# Patient Record
Sex: MALE | Race: ASIAN | NOT HISPANIC OR LATINO | Employment: FULL TIME | URBAN - METROPOLITAN AREA
[De-identification: names, ages, dates, MRNs, and addresses within clinical notes are randomized per-mention and may not be internally consistent; named-entity substitution may affect disease eponyms.]

---

## 2017-11-08 ENCOUNTER — GENERIC CONVERSION - ENCOUNTER (OUTPATIENT)
Dept: OTHER | Facility: OTHER | Age: 47
End: 2017-11-08

## 2018-01-09 NOTE — MISCELLANEOUS
Provider Comments  Provider Comments:   Dear Santi Herrera,    You missed your appointment with Dr Hernández Born on 11/08/2017; please contact our office to reschedule at [phone]  We understand that many situations arise that occasionally prevents patients from keeping scheduled appointments  It is the policy of [facility] that patients notify us 24 hours in advance if unable to keep a scheduled appointment  Missed appointments jeopardize strong physician-patient relationships  The appointment you missed could have easily been made available to another patient if you had contacted us to cancel  We like to accommodate all of our patients, but when patients miss an appointment it prevents us from being able to help everyone  In the future, we request at least 24 hours' notice of cancellation so we can make your appointment available to someone else in need       Sincerely,    The Physicians and Staff of [facility]        Signatures   Electronically signed by : Myron Lamb, ; Nov 8 2017  1:45PM EST                       (Author)

## 2018-09-10 PROBLEM — I10 BENIGN ESSENTIAL HYPERTENSION: Status: ACTIVE | Noted: 2017-09-25

## 2018-09-10 PROBLEM — E11.9 TYPE 2 DIABETES MELLITUS WITHOUT COMPLICATION (HCC): Status: ACTIVE | Noted: 2017-09-25

## 2018-09-12 ENCOUNTER — OFFICE VISIT (OUTPATIENT)
Dept: FAMILY MEDICINE CLINIC | Facility: CLINIC | Age: 48
End: 2018-09-12
Payer: COMMERCIAL

## 2018-09-12 VITALS
RESPIRATION RATE: 16 BRPM | OXYGEN SATURATION: 95 % | HEIGHT: 74 IN | BODY MASS INDEX: 29 KG/M2 | TEMPERATURE: 98.1 F | DIASTOLIC BLOOD PRESSURE: 78 MMHG | SYSTOLIC BLOOD PRESSURE: 114 MMHG | HEART RATE: 98 BPM | WEIGHT: 226 LBS

## 2018-09-12 DIAGNOSIS — E11.8 TYPE 2 DIABETES MELLITUS WITH COMPLICATION, UNSPECIFIED WHETHER LONG TERM INSULIN USE: Primary | ICD-10-CM

## 2018-09-12 DIAGNOSIS — I10 BENIGN ESSENTIAL HYPERTENSION: ICD-10-CM

## 2018-09-12 DIAGNOSIS — Z23 NEED FOR PNEUMOCOCCAL VACCINATION: ICD-10-CM

## 2018-09-12 DIAGNOSIS — Z23 NEED FOR INFLUENZA VACCINATION: ICD-10-CM

## 2018-09-12 DIAGNOSIS — E11.9 TYPE 2 DIABETES MELLITUS WITHOUT COMPLICATION, WITHOUT LONG-TERM CURRENT USE OF INSULIN (HCC): ICD-10-CM

## 2018-09-12 DIAGNOSIS — K21.9 GASTROESOPHAGEAL REFLUX DISEASE WITHOUT ESOPHAGITIS: Primary | ICD-10-CM

## 2018-09-12 PROBLEM — R10.13 DYSPEPSIA: Status: ACTIVE | Noted: 2018-09-12

## 2018-09-12 PROBLEM — K63.5 COLON POLYP: Status: ACTIVE | Noted: 2018-09-12

## 2018-09-12 PROBLEM — K44.9 HIATAL HERNIA: Status: ACTIVE | Noted: 2018-09-12

## 2018-09-12 PROBLEM — K58.1 IRRITABLE BOWEL SYNDROME WITH CONSTIPATION: Status: ACTIVE | Noted: 2018-09-12

## 2018-09-12 PROBLEM — F41.9 ANXIETY: Status: ACTIVE | Noted: 2018-09-12

## 2018-09-12 PROCEDURE — 3008F BODY MASS INDEX DOCD: CPT | Performed by: FAMILY MEDICINE

## 2018-09-12 PROCEDURE — 99204 OFFICE O/P NEW MOD 45 MIN: CPT | Performed by: FAMILY MEDICINE

## 2018-09-12 RX ORDER — PRAVASTATIN SODIUM 40 MG
1 TABLET ORAL DAILY
COMMUNITY
Start: 2018-07-11 | End: 2018-10-10 | Stop reason: ALTCHOICE

## 2018-09-12 RX ORDER — CLOBETASOL PROPIONATE 0.5 MG/G
CREAM TOPICAL EVERY 12 HOURS
COMMUNITY
Start: 2017-09-25 | End: 2018-09-21 | Stop reason: SDUPTHER

## 2018-09-12 RX ORDER — LISINOPRIL 5 MG/1
1 TABLET ORAL EVERY 24 HOURS
COMMUNITY
Start: 2017-09-25 | End: 2019-10-16 | Stop reason: SDUPTHER

## 2018-09-12 RX ORDER — CLOBETASOL PROPIONATE 0.05 G/100ML
SHAMPOO TOPICAL
COMMUNITY
End: 2018-09-21 | Stop reason: SDUPTHER

## 2018-09-12 NOTE — TELEPHONE ENCOUNTER
patient says Linagliptin-Metformin HCI (Jentadueto) 2 5-1000 is not covered neither is Janumet please advise

## 2018-09-12 NOTE — PATIENT INSTRUCTIONS
10% - bad control"> 10% - bad control,Hemoglobin A1c (HbA1c) greater than 10% indicating poor diabetic control,Haemoglobin A1c greater than 10% indicating poor diabetic control">   Diabetes Mellitus Type 2 in Adults, Ambulatory Care   GENERAL INFORMATION:   Diabetes mellitus type 2  is a disease that affects how your body uses glucose (sugar)  Insulin helps move sugar out of the blood so it can be used for energy  Normally, when the blood sugar level increases, the pancreas makes more insulin  Type 2 diabetes develops because either the body cannot make enough insulin, or it cannot use the insulin correctly  After many years, your pancreas may stop making insulin  Common symptoms include the following:   · More hunger or thirst than usual     · Frequent urination     · Weight loss without trying     · Blurred vision  Seek immediate care for the following symptoms:   · Severe abdominal pain, or pain that spreads to your back  You may also be vomiting  · Trouble staying awake or focusing    · Shaking or sweating    · Blurred or double vision    · Breath has a fruity, sweet smell    · Breathing is deep and labored, or rapid and shallow    · Heartbeat is fast and weak  Treatment for diabetes mellitus type 2  includes keeping your blood sugar at a normal level  You must eat the right foods, and exercise regularly  You may also need medicine if you cannot control your blood sugar level with nutrition and exercise  Manage diabetes mellitus type 2:   · Check your blood sugar level  You will be taught how to check a small drop of blood in a glucose monitor  Ask your healthcare provider when and how often to check during the day  Ask your healthcare provider what your blood sugar levels should be when you check them  · Keep track of carbohydrates (sugar and starchy foods)  Your blood sugar level can get too high if you eat too many carbohydrates   Your dietitian will help you plan meals and snacks that have the right amount of carbohydrates  · Eat low-fat foods  Some examples are skinless chicken and low-fat milk  · Eat less sodium (salt)  Some examples of high-sodium foods to limit are soy sauce, potato chips, and soup  Do not add salt to food you cook  Limit your use of table salt  · Eat high-fiber foods  Foods that are a good source of fiber include vegetables, whole grain bread, and beans  · Limit alcohol  Alcohol affects your blood sugar level and can make it harder to manage your diabetes  Women should limit alcohol to 1 drink a day  Men should limit alcohol to 2 drinks a day  A drink of alcohol is 12 ounces of beer, 5 ounces of wine, or 1½ ounces of liquor  · Get regular exercise  Exercise can help keep your blood sugar level steady, decrease your risk of heart disease, and help you lose weight  Exercise for at least 30 minutes, 5 days a week  Include muscle strengthening activities 2 days each week  Work with your healthcare provider to create an exercise plan  · Check your feet each day  for injuries or open sores  Ask your healthcare provider for activities you can do if you have an open sore  · Quit smoking  If you smoke, it is never too late to quit  Smoking can worsen the problems that may occur with diabetes  Ask your healthcare provider for information about how to stop smoking if you are having trouble quitting  · Ask about your weight:  Ask healthcare providers if you need to lose weight, and how much to lose  Ask them to help you with a weight loss program  Even a 10 to 15 pound weight loss can help you manage your blood sugar level  · Carry medical alert identification  Wear medical alert jewelry or carry a card that says you have diabetes  Ask your healthcare provider where to get these items  · Ask about vaccines  Diabetes puts you at risk of serious illness if you get the flu, pneumonia, or hepatitis   Ask your healthcare provider if you should get a flu, pneumonia, or hepatitis B vaccine, and when to get the vaccine  Follow up with your healthcare provider as directed:  Write down your questions so you remember to ask them during your visits  CARE AGREEMENT:   You have the right to help plan your care  Learn about your health condition and how it may be treated  Discuss treatment options with your caregivers to decide what care you want to receive  You always have the right to refuse treatment  The above information is an  only  It is not intended as medical advice for individual conditions or treatments  Talk to your doctor, nurse or pharmacist before following any medical regimen to see if it is safe and effective for you  © 2014 [address] is for End User's use only and may not be sold, redistributed or otherwise used for commercial purposes  All illustrations and images included in CareNotes® are the copyrighted property of A D A M , Inc  or Newton Sinclair

## 2018-09-13 NOTE — ASSESSMENT & PLAN NOTE
Chronic patient did have a blood work for more 6 months  Juan Alberto Gallegos not covered by [employer] will start the patient on metformin 1000 twice a day and then Elenita S 25 mg once a day   patient already on Ace inhibitor and he is on statin per patient he does followed by Ophthalmology for diabetic eye care

## 2018-09-13 NOTE — PROGRESS NOTES
Assessment/Plan:    Benign essential hypertension  Chronic with controlled continue current medication low-salt diet less than 2 g a day ,   low caffeine intake   regular aerobic exercise 20 to totally minute a day diet and important lose weight discussed with the patient      Gastroesophageal reflux disease without esophagitis  Chronic ,well controlled by Nexium  Discuss with pt important lose weight   Multiple small meal ,avoid eat and lying down ,avoid spicy food and avoid provoked food        Type 2 diabetes mellitus without complication ([facility] [address] )  Chronic patient did have a blood work for more 6 months  Derek Candmaría not covered by [employer] will start the patient on metformin 1000 twice a day and then Elenita S 25 mg once a day   patient already on Ace inhibitor and he is on statin per patient he does followed by Ophthalmology for diabetic eye care         Diagnoses and all orders for this visit:    Gastroesophageal reflux disease without esophagitis  -     CBC and differential; Future  -     Comprehensive metabolic panel; Future  -     Hemoglobin A1C; Future  -     Lipid Panel with Direct LDL reflex; Future  -     Microalbumin / creatinine urine ratio; Future  -     TSH, 3rd generation with Free T4 reflex; Future  -     PSA, Total Screen; Future    Type 2 diabetes mellitus without complication, without long-term current use of insulin ([facility])  -     CBC and differential; Future  -     Comprehensive metabolic panel; Future  -     Hemoglobin A1C; Future  -     Lipid Panel with Direct LDL reflex; Future  -     Microalbumin / creatinine urine ratio; Future  -     TSH, 3rd generation with Free T4 reflex; Future  -     PSA, Total Screen; Future    Benign essential hypertension  -     CBC and differential; Future  -     Comprehensive metabolic panel; Future  -     Hemoglobin A1C; Future  -     Lipid Panel with Direct LDL reflex; Future  -     Microalbumin / creatinine urine ratio;  Future  -     TSH, 3rd generation with Free T4 reflex; Future  -     PSA, Total Screen; Future    Need for influenza vaccination  Comments:  Patient declined flu shot  Orders:  -     influenza vaccine, 8565-1174, quadrivalent, recombinant, PF, 0 5 mL, for patients 18-49 yr with comorbidities (FLUBLOK)    Need for pneumococcal vaccination  Comments:  Patient declined hand pneumonia shot  Orders:  -     Pneumococcal Polysaccharide Vaccine 23-Valent =>3yo SQ IM    Other orders  -     pravastatin (PRAVACHOL) 40 mg tablet; Take 1 tablet by mouth daily  -     esomeprazole (NEXIUM) 40 MG capsule; Take 1 capsule by mouth Every 12 hours  -     lisinopril (ZESTRIL) 5 mg tablet; Take 1 tablet by mouth every 24 hours  -     Discontinue: Linagliptin-Metformin HCl (JENTADUETO) 2 5-1000 MG TABS; Take 1 tablet by mouth Every 12 hours  -     clobetasol propionate (CLOBEX) 0 05 % shampoo; Apply topically  -     clobetasol (TEMOVATE) 0 05 % cream; Every 12 hours             Patient ID: Justin Vines is a 51 y o  male  Patient in my office new patient he had history of hypertension and insulin-dependent diabetes history of for GERD and his previous P CP smoking of 2 practice and [facility] care patient deny any chest pain short of breath no palpitation no headache no blurred vision no weakness her cause of the symptom no abdomen pain nausea vomiting or diarrhea and no urinary problem  And patient did not have a blood work for a long time his current medication for diabetes not covered by his insurance and requesting changing medication        The following portions of the patient's history were reviewed and updated as appropriate: allergies, current medications, past family history, past medical history, past social history, past surgical history and problem list     Review of Systems   Constitutional: Negative for fatigue and fever  HENT: Negative for ear pain, sinus pain, sinus pressure and sore throat  Eyes: Negative for pain and redness  Respiratory: Negative for cough, chest tightness and shortness of breath  Cardiovascular: Negative for chest pain, palpitations and leg swelling  Gastrointestinal: Negative for abdominal pain, blood in stool, constipation, diarrhea and nausea  Genitourinary: Negative for flank pain, frequency and hematuria  Musculoskeletal: Negative for back pain and joint swelling  Skin: Negative for rash  Neurological: Negative for dizziness, numbness and headaches  Hematological: Does not bruise/bleed easily  Objective:  Vitals:    09/12/18 0916   BP: 114/78   BP Location: Left arm   Patient Position: Sitting   Cuff Size: Large   Pulse: 98   Resp: 16   Temp: 98 1 °F (36 7 °C)   TempSrc: Oral   SpO2: 95%   Weight: 103 kg (226 lb)   Height: 6' 2" (1 88 m)      Physical Exam   Constitutional: He is oriented to person, place, and time  He appears well-developed and well-nourished  HENT:   Head: Normocephalic  Right Ear: External ear normal    Left Ear: External ear normal    Eyes: Conjunctivae and EOM are normal  Right eye exhibits no discharge  Left eye exhibits no discharge  Neck: No JVD present  Cardiovascular: Normal rate, regular rhythm and normal heart sounds  Exam reveals no gallop  Pulses are no weak pulses  No murmur heard  Pulses:       Dorsalis pedis pulses are 2+ on the right side, and 2+ on the left side  Pulmonary/Chest: Effort normal  No respiratory distress  He has no wheezes  He has no rales  He exhibits no tenderness  Abdominal: He exhibits no mass  There is no tenderness  There is no rebound  Musculoskeletal: He exhibits no edema or tenderness  Feet:   Right Foot:   Skin Integrity: Negative for warmth  Left Foot:   Skin Integrity: Negative for warmth  Neurological: He is alert and oriented to person, place, and time  Skin: No rash noted  No erythema  Patient's shoes and socks removed  Right Foot/Ankle   Right Foot Inspection  Skin Exam: skin not intact, no warmth and no pre-ulcer                          Toe Exam: no swelling and erythema  Sensory       Monofilament testing: intact  Vascular  Capillary refills: < 3 seconds  The right DP pulse is 2+  Left Foot/Ankle  Left Foot Inspection  Skin Exam: skin not intact, no warmth and no pre-ulcer                         Toe Exam: no swelling and no erythema                   Sensory       Monofilament: intact  Vascular  Capillary refills: < 3 seconds  The left DP pulse is 2+  Assign Risk Category:  No deformity present; No loss of protective sensation;  No weak pulses       Risk: 0

## 2018-09-21 DIAGNOSIS — L21.9 SEBORRHEIC DERMATITIS: Primary | ICD-10-CM

## 2018-09-21 RX ORDER — CLOBETASOL PROPIONATE 0.05 G/100ML
SHAMPOO TOPICAL 2 TIMES DAILY
Qty: 118 ML | Refills: 2 | Status: SHIPPED | OUTPATIENT
Start: 2018-09-21 | End: 2019-10-16 | Stop reason: SDUPTHER

## 2018-09-21 RX ORDER — CLOBETASOL PROPIONATE 0.5 MG/G
CREAM TOPICAL 2 TIMES DAILY
Qty: 30 G | Refills: 2 | Status: SHIPPED | OUTPATIENT
Start: 2018-09-21 | End: 2019-10-16 | Stop reason: SDUPTHER

## 2018-10-03 ENCOUNTER — LAB (OUTPATIENT)
Dept: LAB | Facility: MEDICAL CENTER | Age: 48
End: 2018-10-03
Payer: COMMERCIAL

## 2018-10-03 DIAGNOSIS — I10 BENIGN ESSENTIAL HYPERTENSION: ICD-10-CM

## 2018-10-03 DIAGNOSIS — K21.9 GASTROESOPHAGEAL REFLUX DISEASE WITHOUT ESOPHAGITIS: ICD-10-CM

## 2018-10-03 DIAGNOSIS — E11.9 TYPE 2 DIABETES MELLITUS WITHOUT COMPLICATION, WITHOUT LONG-TERM CURRENT USE OF INSULIN (HCC): ICD-10-CM

## 2018-10-03 LAB
ALBUMIN SERPL BCP-MCNC: 4 G/DL (ref 3.5–5)
ALP SERPL-CCNC: 66 U/L (ref 46–116)
ALT SERPL W P-5'-P-CCNC: 77 U/L (ref 12–78)
ANION GAP SERPL CALCULATED.3IONS-SCNC: 8 MMOL/L (ref 4–13)
AST SERPL W P-5'-P-CCNC: 52 U/L (ref 5–45)
BASOPHILS # BLD AUTO: 0.08 THOUSANDS/ΜL (ref 0–0.1)
BASOPHILS NFR BLD AUTO: 1 % (ref 0–1)
BILIRUB SERPL-MCNC: 0.65 MG/DL (ref 0.2–1)
BUN SERPL-MCNC: 10 MG/DL (ref 5–25)
CALCIUM SERPL-MCNC: 9 MG/DL (ref 8.3–10.1)
CHLORIDE SERPL-SCNC: 98 MMOL/L (ref 100–108)
CHOLEST SERPL-MCNC: 149 MG/DL (ref 50–200)
CO2 SERPL-SCNC: 26 MMOL/L (ref 21–32)
CREAT SERPL-MCNC: 0.98 MG/DL (ref 0.6–1.3)
CREAT UR-MCNC: 214 MG/DL
EOSINOPHIL # BLD AUTO: 0.84 THOUSAND/ΜL (ref 0–0.61)
EOSINOPHIL NFR BLD AUTO: 10 % (ref 0–6)
ERYTHROCYTE [DISTWIDTH] IN BLOOD BY AUTOMATED COUNT: 12.9 % (ref 11.6–15.1)
EST. AVERAGE GLUCOSE BLD GHB EST-MCNC: 212 MG/DL
GFR SERPL CREATININE-BSD FRML MDRD: 90 ML/MIN/1.73SQ M
GLUCOSE P FAST SERPL-MCNC: 211 MG/DL (ref 65–99)
HBA1C MFR BLD: 9 % (ref 4.2–6.3)
HCT VFR BLD AUTO: 44.6 % (ref 36.5–49.3)
HDLC SERPL-MCNC: 32 MG/DL (ref 40–60)
HGB BLD-MCNC: 14.5 G/DL (ref 12–17)
IMM GRANULOCYTES # BLD AUTO: 0.03 THOUSAND/UL (ref 0–0.2)
IMM GRANULOCYTES NFR BLD AUTO: 0 % (ref 0–2)
LDLC SERPL CALC-MCNC: 41 MG/DL (ref 0–100)
LYMPHOCYTES # BLD AUTO: 3.22 THOUSANDS/ΜL (ref 0.6–4.47)
LYMPHOCYTES NFR BLD AUTO: 40 % (ref 14–44)
MCH RBC QN AUTO: 26.8 PG (ref 26.8–34.3)
MCHC RBC AUTO-ENTMCNC: 32.5 G/DL (ref 31.4–37.4)
MCV RBC AUTO: 82 FL (ref 82–98)
MICROALBUMIN UR-MCNC: 38 MG/L (ref 0–20)
MICROALBUMIN/CREAT 24H UR: 18 MG/G CREATININE (ref 0–30)
MONOCYTES # BLD AUTO: 0.55 THOUSAND/ΜL (ref 0.17–1.22)
MONOCYTES NFR BLD AUTO: 7 % (ref 4–12)
NEUTROPHILS # BLD AUTO: 3.33 THOUSANDS/ΜL (ref 1.85–7.62)
NEUTS SEG NFR BLD AUTO: 42 % (ref 43–75)
NRBC BLD AUTO-RTO: 0 /100 WBCS
PLATELET # BLD AUTO: 251 THOUSANDS/UL (ref 149–390)
PMV BLD AUTO: 11 FL (ref 8.9–12.7)
POTASSIUM SERPL-SCNC: 4 MMOL/L (ref 3.5–5.3)
PROT SERPL-MCNC: 7.9 G/DL (ref 6.4–8.2)
PSA SERPL-MCNC: 0.2 NG/ML (ref 0–4)
RBC # BLD AUTO: 5.42 MILLION/UL (ref 3.88–5.62)
SODIUM SERPL-SCNC: 132 MMOL/L (ref 136–145)
TRIGL SERPL-MCNC: 382 MG/DL
TSH SERPL DL<=0.05 MIU/L-ACNC: 2.3 UIU/ML (ref 0.36–3.74)
WBC # BLD AUTO: 8.05 THOUSAND/UL (ref 4.31–10.16)

## 2018-10-03 PROCEDURE — 3061F NEG MICROALBUMINURIA REV: CPT | Performed by: FAMILY MEDICINE

## 2018-10-03 PROCEDURE — 80061 LIPID PANEL: CPT

## 2018-10-03 PROCEDURE — 80053 COMPREHEN METABOLIC PANEL: CPT

## 2018-10-03 PROCEDURE — 83036 HEMOGLOBIN GLYCOSYLATED A1C: CPT

## 2018-10-03 PROCEDURE — 84443 ASSAY THYROID STIM HORMONE: CPT

## 2018-10-03 PROCEDURE — 82043 UR ALBUMIN QUANTITATIVE: CPT

## 2018-10-03 PROCEDURE — 85025 COMPLETE CBC W/AUTO DIFF WBC: CPT

## 2018-10-03 PROCEDURE — 82570 ASSAY OF URINE CREATININE: CPT

## 2018-10-03 PROCEDURE — G0103 PSA SCREENING: HCPCS

## 2018-10-03 PROCEDURE — 36415 COLL VENOUS BLD VENIPUNCTURE: CPT

## 2018-10-04 DIAGNOSIS — E87.1 HYPONATREMIA: Primary | ICD-10-CM

## 2018-10-10 ENCOUNTER — OFFICE VISIT (OUTPATIENT)
Dept: FAMILY MEDICINE CLINIC | Facility: CLINIC | Age: 48
End: 2018-10-10
Payer: COMMERCIAL

## 2018-10-10 ENCOUNTER — APPOINTMENT (OUTPATIENT)
Dept: LAB | Facility: MEDICAL CENTER | Age: 48
End: 2018-10-10
Payer: COMMERCIAL

## 2018-10-10 ENCOUNTER — APPOINTMENT (OUTPATIENT)
Dept: RADIOLOGY | Facility: MEDICAL CENTER | Age: 48
End: 2018-10-10
Payer: COMMERCIAL

## 2018-10-10 VITALS
TEMPERATURE: 97.7 F | WEIGHT: 226 LBS | DIASTOLIC BLOOD PRESSURE: 80 MMHG | HEIGHT: 72 IN | SYSTOLIC BLOOD PRESSURE: 110 MMHG | HEART RATE: 94 BPM | OXYGEN SATURATION: 98 % | BODY MASS INDEX: 30.61 KG/M2

## 2018-10-10 DIAGNOSIS — E78.1 PURE HYPERGLYCERIDEMIA: ICD-10-CM

## 2018-10-10 DIAGNOSIS — E87.1 HYPONATREMIA: ICD-10-CM

## 2018-10-10 DIAGNOSIS — M54.50 LOW BACK PAIN WITHOUT SCIATICA, UNSPECIFIED BACK PAIN LATERALITY, UNSPECIFIED CHRONICITY: ICD-10-CM

## 2018-10-10 DIAGNOSIS — Z00.01 ENCOUNTER FOR GENERAL ADULT MEDICAL EXAMINATION WITH ABNORMAL FINDINGS: Primary | ICD-10-CM

## 2018-10-10 LAB
ANION GAP SERPL CALCULATED.3IONS-SCNC: 7 MMOL/L (ref 4–13)
BACTERIA UR QL AUTO: NORMAL /HPF
BILIRUB UR QL STRIP: NEGATIVE
BUN SERPL-MCNC: 11 MG/DL (ref 5–25)
CALCIUM SERPL-MCNC: 10.1 MG/DL (ref 8.3–10.1)
CHLORIDE SERPL-SCNC: 98 MMOL/L (ref 100–108)
CLARITY UR: CLEAR
CO2 SERPL-SCNC: 29 MMOL/L (ref 21–32)
COLOR UR: YELLOW
CREAT SERPL-MCNC: 1.06 MG/DL (ref 0.6–1.3)
GFR SERPL CREATININE-BSD FRML MDRD: 82 ML/MIN/1.73SQ M
GLUCOSE P FAST SERPL-MCNC: 200 MG/DL (ref 65–99)
GLUCOSE UR STRIP-MCNC: ABNORMAL MG/DL
HGB UR QL STRIP.AUTO: NEGATIVE
HYALINE CASTS #/AREA URNS LPF: NORMAL /LPF
KETONES UR STRIP-MCNC: ABNORMAL MG/DL
LEUKOCYTE ESTERASE UR QL STRIP: ABNORMAL
NITRITE UR QL STRIP: NEGATIVE
NON-SQ EPI CELLS URNS QL MICRO: NORMAL /HPF
PH UR STRIP.AUTO: 6 [PH] (ref 4.5–8)
POTASSIUM SERPL-SCNC: 4.1 MMOL/L (ref 3.5–5.3)
PROT UR STRIP-MCNC: NEGATIVE MG/DL
RBC #/AREA URNS AUTO: NORMAL /HPF
SL AMB  POCT GLUCOSE, UA: 500
SL AMB LEUKOCYTE ESTERASE,UA: ABNORMAL
SL AMB POCT BILIRUBIN,UA: ABNORMAL
SL AMB POCT BLOOD,UA: ABNORMAL
SL AMB POCT CLARITY,UA: CLEAR
SL AMB POCT COLOR,UA: YELLOW
SL AMB POCT KETONES,UA: ABNORMAL
SL AMB POCT NITRITE,UA: ABNORMAL
SL AMB POCT PH,UA: 5
SL AMB POCT SPECIFIC GRAVITY,UA: 1010
SL AMB POCT URINE PROTEIN: ABNORMAL
SL AMB POCT UROBILINOGEN: 0.2
SODIUM SERPL-SCNC: 134 MMOL/L (ref 136–145)
SP GR UR STRIP.AUTO: 1.02 (ref 1–1.03)
UROBILINOGEN UR QL STRIP.AUTO: 0.2 E.U./DL
WBC #/AREA URNS AUTO: NORMAL /HPF

## 2018-10-10 PROCEDURE — 81001 URINALYSIS AUTO W/SCOPE: CPT | Performed by: FAMILY MEDICINE

## 2018-10-10 PROCEDURE — 72110 X-RAY EXAM L-2 SPINE 4/>VWS: CPT

## 2018-10-10 PROCEDURE — 87086 URINE CULTURE/COLONY COUNT: CPT | Performed by: FAMILY MEDICINE

## 2018-10-10 PROCEDURE — 99214 OFFICE O/P EST MOD 30 MIN: CPT | Performed by: FAMILY MEDICINE

## 2018-10-10 PROCEDURE — 81002 URINALYSIS NONAUTO W/O SCOPE: CPT | Performed by: FAMILY MEDICINE

## 2018-10-10 PROCEDURE — 99396 PREV VISIT EST AGE 40-64: CPT | Performed by: FAMILY MEDICINE

## 2018-10-10 PROCEDURE — 80048 BASIC METABOLIC PNL TOTAL CA: CPT

## 2018-10-10 PROCEDURE — 36415 COLL VENOUS BLD VENIPUNCTURE: CPT

## 2018-10-10 RX ORDER — ATORVASTATIN CALCIUM 20 MG/1
20 TABLET, FILM COATED ORAL DAILY
Qty: 90 TABLET | Refills: 0 | Status: SHIPPED | OUTPATIENT
Start: 2018-10-10 | End: 2019-10-16 | Stop reason: SDUPTHER

## 2018-10-10 NOTE — ASSESSMENT & PLAN NOTE
A new finding asymptomatic will repeat the the sodium level to rule out lab error discussed with the patient if it is persistent to be low will restrict fluid to 1200 cc a day and to refer the patient to Nephrology

## 2018-10-10 NOTE — ASSESSMENT & PLAN NOTE
Uncontrolled start patient on atorvastatin 20 mg once a day proper use of medication possible side effect discussed with the patient

## 2018-10-10 NOTE — PROGRESS NOTES
[facility] HEALTH MAINTENANCE OFFICE VISIT  [facility]    NAME: Jesus Willis  AGE: 51 y o   SEX: male  : 1970     DATE: 10/10/2018    Assessment and Plan     Problem List Items Addressed This Visit     Pure hyperglyceridemia     Uncontrolled start patient on atorvastatin 20 mg once a day proper use of medication possible side effect discussed with the patient         Relevant Medications    atorvastatin (LIPITOR) 20 mg tablet    BMI 30 0-30 9,adult     The uncontrolled lifestyle modification including increased activity 30 min a day 5 days a week and a healthy diet portion control discussed with the patient patient declined refer to see nutritionist         Low back pain without sciatica     Symptomatic recommended Tylenol for the pain we discussed also important lose weight proper postural and proper footwear will order x-ray of the lumbar spine         Relevant Orders    Urinalysis with reflex to microscopic    POCT urine dip (Completed)    Urine culture    XR spine lumbar minimum 4 views non injury    Hyponatremia     A new finding asymptomatic will repeat the the sodium level to rule out lab error discussed with the patient if it is persistent to be low will restrict fluid to 1200 cc a day and to refer the patient to Nephrology           Other Visit Diagnoses     Encounter for general adult medical examination with abnormal findings    -  Primary    Increased activity healthy diet portion controlled will hydration sunscreen discussed with the patient also discussed immunization he refuses Flu vaccine            · Patient Counseling:   · Nutrition: Stressed importance of a well balanced diet, moderation of sodium/saturated fat, caloric balance and sufficient intake of fiber  · Exercise: Stressed the importance of regular exercise with a goal of 150 minutes per week  · Dental Health: Discussed daily flossing and brushing and regular dental visits     · Immunizations reviewed patient due for flu shot and he declined for today   · Discussed benefits of screening  no family history of colon cancer no family history of prostate cancer  · Discussed the patient's BMI with him  The BMI is above average; BMI management plan is completed     Return in about 4 weeks (around 11/7/2018)  Chief Complaint     Chief Complaint   Patient presents with    Physical Exam    Follow-up     chronic conditions       History of Present Illness     The patient here for his Wellness physical exam patient deny any chest pain short of breath no palpitation no headache no blurred vision no weakness or lateralized of the symptom no abdomen pain no nausea vomiting or diarrhea no renal problem no rash no fever no change in the weight  Patient deny smoking he does not do exercise he watch for low carb diet and no family history or personal history of colon cancer  Or prostate cancer the patient today concerned about the low back pain he described it as achy and 6/10 localized in the lower back and no radiation no no muscle weakness no numbness no lose control of the urine or stool no recent trauma 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  Patient recently had a blood work and it show his hemoglobin A1c is above 9 patient was on [place] and his blood sugar was uncontrolled patient also was not follow low carb diet patient is asymptomatic no increased thirst no increased frequency urination   Blood work discussed with the patient show his sodium is low patient asymptomatic no headache no blurred vision no numbness patient also triglyceride is  elevated        Well Adult Physical   Patient here for a comprehensive physical exam       Diet and Physical Activity  Diet: low carbohydrate diet  Weight concerns: Patient has class 1 obesity (BMI 30-34  9)  Exercise: never      Depression Screen  PHQ-9 Depression Screening    PHQ-9:    Frequency of the following problems over the past two weeks:       Little interest or pleasure in doing things:  0 - not at all  Feeling down, depressed, or hopeless:  0 - not at all  PHQ-2 Score:  0          General Health  Hearing: Normal:  bilateral  Vision: no vision problems  Dental: regular dental visits        The following portions of the patient's history were reviewed and updated as appropriate: allergies, current medications, past family history, past medical history, past social history, past surgical history and problem list     Review of Systems     Review of Systems   Constitutional: Negative for fatigue and fever  HENT: Negative for ear pain, sinus pain, sinus pressure and sore throat  Eyes: Negative for pain and redness  Respiratory: Negative for cough, chest tightness and shortness of breath  Cardiovascular: Negative for chest pain, palpitations and leg swelling  Gastrointestinal: Negative for abdominal pain, blood in stool, constipation, diarrhea and nausea  Genitourinary: Negative for flank pain, frequency and hematuria  Musculoskeletal: Positive for back pain  Negative for joint swelling  Skin: Negative for rash  Neurological: Negative for dizziness, numbness and headaches  Hematological: Does not bruise/bleed easily  Psychiatric/Behavioral: Negative for agitation and behavioral problems         Past Medical History     Past Medical History:   Diagnosis Date    Anxiety 9/12/2018    Diabetes mellitus ([facility] [address] )     GERD (gastroesophageal reflux disease)     Hypertension        Past Surgical History     Past Surgical History:   Procedure Laterality Date    LASIK Bilateral 2004    PILONIDAL CYST EXCISION  2011       Social History     Social History     Social History    Marital status: /Civil Union     Spouse name: N/A    Number of children: N/A    Years of education: N/A     Social History Main Topics    Smoking status: Never Smoker    Smokeless tobacco: Never Used    Alcohol use No    Drug use: No    Sexual activity: Not Asked     Other Topics Concern    None     Social History Narrative    None       Family History     Family History   Problem Relation Age of Onset    Heart disease Father     Diabetes type II Brother         MELLITUS    Heart disease Brother        Current Medications       Current Outpatient Prescriptions:     clobetasol (TEMOVATE) 0 05 % cream, Apply topically 2 (two) times a day, Disp: 30 g, Rfl: 2    clobetasol propionate (CLOBEX) 0 05 % shampoo, Apply topically 2 (two) times a day, Disp: 118 mL, Rfl: 2    Empagliflozin (JARDIANCE) 25 MG TABS, Take 1 tablet (25 mg total) by mouth every morning, Disp: 90 tablet, Rfl: 0    esomeprazole (NEXIUM) 40 MG capsule, Take 1 capsule by mouth Every 12 hours, Disp: , Rfl:     lisinopril (ZESTRIL) 5 mg tablet, Take 1 tablet by mouth every 24 hours, Disp: , Rfl:     metFORMIN (GLUCOPHAGE) 1000 MG tablet, Take 1 tablet (1,000 mg total) by mouth 2 (two) times a day with meals, Disp: 180 tablet, Rfl: 0    atorvastatin (LIPITOR) 20 mg tablet, Take 1 tablet (20 mg total) by mouth daily, Disp: 90 tablet, Rfl: 0     Allergies     Allergies   Allergen Reactions    No Active Allergies        Objective     /80   Pulse 94   Temp 97 7 °F (36 5 °C) (Oral)   Ht 6' (1 829 m)   Wt 103 kg (226 lb)   SpO2 98%   BMI 30 65 kg/m²      Physical Exam   Constitutional: He is oriented to person, place, and time  He appears well-developed and well-nourished  HENT:   Head: Normocephalic  Right Ear: External ear normal    Left Ear: External ear normal    Eyes: Conjunctivae and EOM are normal  Right eye exhibits no discharge  Left eye exhibits no discharge  Neck: No JVD present  Cardiovascular: Normal rate, regular rhythm and normal heart sounds  Exam reveals no gallop  No murmur heard  Pulmonary/Chest: Effort normal  No respiratory distress  He has no wheezes  He has no rales  He exhibits no tenderness     Abdominal: He exhibits no mass  There is no tenderness  There is no rebound  Musculoskeletal: He exhibits tenderness  He exhibits no edema  Positive tenderness and the lumbar spine L4-L5 leg raise test positive in the right side   Neurological: He is alert and oriented to person, place, and time  Skin: No rash noted  No erythema             Health Maintenance     Health Maintenance   Topic Date Due    Depression Screening PHQ  1970    DM Eye Exam  08/03/1979    Pneumococcal PPSV23 Medium Risk Adult (1 of 1 - PPSV23) 08/03/1988    DTaP,Tdap,and Td Vaccines (1 - Tdap) 08/03/1990    INFLUENZA VACCINE  07/01/2018    Diabetic Foot Exam  09/12/2019    URINE MICROALBUMIN  10/03/2019     Immunization History   Administered Date(s) Administered    Meningococcal Polysaccharide (MPSV4) 02/10/2016       Ivan Matute MD  [address] [place] [address]

## 2018-10-10 NOTE — ASSESSMENT & PLAN NOTE
Lab Results   Component Value Date    HGBA1C 9 0 (H) 10/03/2018    Uncontrolled will continue metformin 1000 twice a day will add Elenita S 25 mg once a day proper use of medication possible side effect discussed with the patient

## 2018-10-10 NOTE — PATIENT INSTRUCTIONS

## 2018-10-10 NOTE — ASSESSMENT & PLAN NOTE
Symptomatic recommended Tylenol for the pain we discussed also important lose weight proper postural and proper footwear will order x-ray of the lumbar spine

## 2018-10-10 NOTE — ASSESSMENT & PLAN NOTE
The uncontrolled lifestyle modification including increased activity 30 min a day 5 days a week and a healthy diet portion control discussed with the patient patient declined refer to see nutritionist

## 2018-10-11 ENCOUNTER — TELEPHONE (OUTPATIENT)
Dept: NEPHROLOGY | Facility: CLINIC | Age: 48
End: 2018-10-11

## 2018-10-11 ENCOUNTER — TELEPHONE (OUTPATIENT)
Dept: FAMILY MEDICINE CLINIC | Facility: CLINIC | Age: 48
End: 2018-10-11

## 2018-10-11 DIAGNOSIS — E87.1 HYPONATREMIA: Primary | ICD-10-CM

## 2018-10-11 LAB — BACTERIA UR CULT: NORMAL

## 2018-10-11 NOTE — TELEPHONE ENCOUNTER
----- Message from Reddy Chan MD sent at 10/10/2018  4:22 PM EDT -----  Sodium still low ,but better from before  ,recomened Fluid restriction 1200 cc/day ,will refer patient to Nephrology for Hyponatremia

## 2018-10-11 NOTE — TELEPHONE ENCOUNTER
----- Message from Georgette Hackett MD sent at 10/11/2018  9:27 AM EDT -----  Was referred to us by his primary physician for evaluation  Please make the appropriate appointment with whoever

## 2018-10-11 NOTE — TELEPHONE ENCOUNTER
Left message, advised pt about the restriction of fluid intake of 1200cc and the ref to nepro , ref letter being mailed to home

## 2018-10-16 NOTE — TELEPHONE ENCOUNTER
Pt is scheduled with Dr Marbella Charles on 10/18  All records are in chart from family doctor   I faxed over new pt paperwork to him at #[phone]

## 2018-10-18 ENCOUNTER — OFFICE VISIT (OUTPATIENT)
Dept: NEPHROLOGY | Facility: CLINIC | Age: 48
End: 2018-10-18
Payer: COMMERCIAL

## 2018-10-18 VITALS
WEIGHT: 227.2 LBS | HEIGHT: 73 IN | SYSTOLIC BLOOD PRESSURE: 106 MMHG | DIASTOLIC BLOOD PRESSURE: 69 MMHG | BODY MASS INDEX: 30.11 KG/M2 | HEART RATE: 84 BPM

## 2018-10-18 DIAGNOSIS — E87.1 HYPONATREMIA: ICD-10-CM

## 2018-10-18 DIAGNOSIS — R80.1 PERSISTENT PROTEINURIA: ICD-10-CM

## 2018-10-18 DIAGNOSIS — I10 BENIGN ESSENTIAL HYPERTENSION: Primary | ICD-10-CM

## 2018-10-18 DIAGNOSIS — E78.5 DYSLIPIDEMIA: ICD-10-CM

## 2018-10-18 LAB
SL AMB  POCT GLUCOSE, UA: 100
SL AMB LEUKOCYTE ESTERASE,UA: ABNORMAL
SL AMB POCT BILIRUBIN,UA: ABNORMAL
SL AMB POCT BLOOD,UA: ABNORMAL
SL AMB POCT CLARITY,UA: CLEAR
SL AMB POCT COLOR,UA: ABNORMAL
SL AMB POCT KETONES,UA: 5
SL AMB POCT NITRITE,UA: ABNORMAL
SL AMB POCT PH,UA: 5
SL AMB POCT SPECIFIC GRAVITY,UA: 1.02
SL AMB POCT URINE PROTEIN: 30
SL AMB POCT UROBILINOGEN: 2

## 2018-10-18 PROCEDURE — 81002 URINALYSIS NONAUTO W/O SCOPE: CPT | Performed by: INTERNAL MEDICINE

## 2018-10-18 PROCEDURE — 99244 OFF/OP CNSLTJ NEW/EST MOD 40: CPT | Performed by: INTERNAL MEDICINE

## 2018-10-18 RX ORDER — ALPRAZOLAM 1 MG/1
1 TABLET ORAL
COMMUNITY

## 2018-10-18 RX ORDER — VENLAFAXINE 75 MG/1
75 TABLET ORAL 2 TIMES DAILY
COMMUNITY

## 2018-10-18 NOTE — LETTER
October 18, 2018     Daquan Vernon MD  [address]    Patient: Patric Lofton   YOB: 1970   Date of Visit: 10/18/2018       Dear Dr Manny Whalen: Thank you for referring Patric Lofton to me for evaluation  Below are my notes for this consultation  If you have questions, please do not hesitate to call me  I look forward to following your patient along with you  Sincerely,        Keven Lin MD        CC: No Recipients  Keven Lin MD  10/18/2018  9:12 AM  Sign at close encounter  Consultation - Nephrology 10/18/2018        History of Present Illness   Reason for Consult / Principal Problem:   Hyponatremia    HPI: Patric Lofton is a 51y o  year old male with a history of diabetes mellitus type 2, GERD and hypertension who we are asked to evaluate regarding hyponatremia:  Labs:  -sodium 140 as of 04/20/2009  -sodium 132 as of 10/03/2018 associated with a glucose of 211  -sodium 134 as of 10/10/2018 associated with glucose of 200  Liver function studies demonstrate a slightly elevated AST, total protein normal and albumin normal  Creatinine is normal  CBC is normal  -TSH 2 3 as of 10/03/2018  -UA:  No RBCs/no WBCs/no proteinuria  -micro albuminuria:  Normal  -PSA normal  Patient reports no history of hyponatremia in the past   He does drink 1 gal at least of water per day  He does not drink any significant alcohol  He does not particularly add salt to food  He does eat adequately any has a good appetite  He denies any NSAID use  He does take Nexium for GERD  He does take Effexor for IBS/anxiety  He denies any leg swelling or shortness of Breath  No change in weight it has been quite stable  History diabetes mellitus type 2 for approximately 3 years on oral medications  No overt retinopathy or neuropathy        General:   Overall feels well no fevers chills recent cough or colds and please see HPI  Cardiovascular:  No chest pain, no significant shortness of breath except when he is exercising which is just restarted, no leg swelling and no palpitations  Respiratory:  No wheezes or shortness of Breath except for above as outlined, no cough  Gastrointestinal:  Patient complains of occasional abdominal bloating and constipation associated with IBS diagnosis several years ago, no nausea or vomiting Halls  No black tarry stools or blood in the stools  Genitourinary:   Only occasional dysuria but nothing persistent, no blood in the urine no foam or bubbles or any other urinary symptoms or voiding problems  Neurology:  No neuropathy, occasional pain in his neck radiating to the head possibly positional it goes away, no dizziness or lightheaded when he stands  Rest of review of systems as completely reviewed with the patient are negative  Recent onset of bilateral back/sacral pain especially on different motions and positions possibly right greater than left    Historical Information   Past Medical History:   Diagnosis Date    Anxiety 9/12/2018    Diabetes mellitus ([facility] [address] )     GERD (gastroesophageal reflux disease)     Hypertension    ·  Hypertriglyceridemia  · No history of cancer/CVA/seizures/heart disease/asthma/emphysema/anemia/lung or liver problems other than mentioned, and no thyroid disease    Past Surgical History:   Procedure Laterality Date    LASIK Bilateral 2004    PILONIDAL CYST EXCISION  2011     Social History   History   Alcohol Use No     History   Drug Use No     History   Smoking Status    Never Smoker   Smokeless Tobacco    Never Used    Modestly salt restricted  Just started exercising last week 3 times a week    Family History   Problem Relation Age of Onset    Heart disease Father     Diabetes type II Brother         MELLITUS    Heart disease Brother     No family history of kidney disease  Father with hypertension, brother with hypertension    Meds/Allergies   all current active meds have been reviewed, current meds: Current Outpatient Prescriptions:     ALPRAZolam (XANAX) 1 mg tablet, Take 1 mg by mouth daily at bedtime as needed for anxiety, Disp: , Rfl:     atorvastatin (LIPITOR) 20 mg tablet, Take 1 tablet (20 mg total) by mouth daily, Disp: 90 tablet, Rfl: 0    clobetasol (TEMOVATE) 0 05 % cream, Apply topically 2 (two) times a day, Disp: 30 g, Rfl: 2    clobetasol propionate (CLOBEX) 0 05 % shampoo, Apply topically 2 (two) times a day, Disp: 118 mL, Rfl: 2    Empagliflozin (JARDIANCE) 25 MG TABS, Take 1 tablet (25 mg total) by mouth every morning, Disp: 90 tablet, Rfl: 0    esomeprazole (NEXIUM) 20 mg capsule, Take 1 capsule by mouth Every 12 hours, Disp: , Rfl:     lisinopril (ZESTRIL) 5 mg tablet, Take 1 tablet by mouth every 24 hours, Disp: , Rfl:     metFORMIN (GLUCOPHAGE) 1000 MG tablet, Take 1 tablet (1,000 mg total) by mouth 2 (two) times a day with meals, Disp: 180 tablet, Rfl: 0    venlafaxine (EFFEXOR) 75 mg tablet, Take 75 mg by mouth 2 (two) times a day, Disp: , Rfl:     Allergies   Allergen Reactions    No Active Allergies        Objective   Vitals:    10/18/18 0816   BP: 106/69   Pulse: 84    Blood pressure on right sittin/90 with a heart rate of 80 and regular  Blood pressure sitting on left:  118/90  Blood pressure standing on left:  116/90 with a heart rate of 96 and regular  Body mass index is 29 98 kg/m²      General:  Well-developed well-nourished, mildly obese, no acute distress  Skin:  No acute rash  Eyes:  No scleral icterus and noninjected  ENT:  Normocephalic/atraumatic, mucous membranes moist  Neck:  Supple, no jugular venous distention, no carotid bruits, 1+ carotid upstroke, no thyromegaly  Back:  No CVA tenderness; no obvious spinal abnormality  Chest:  Clear to auscultation and percussion, good respiratory effort  CVS:  Regular rate and rhythm without a murmur rub or gallops appreciable  Abdomen:  Normal bowel sounds, mildly obese, no hepatosplenomegaly or bruits appreciable, no masses  Extremities:  No clubbing, no cyanosis, no edema,; no femoral bruits, 1+ dorsalis pedis pulses; joints appear normal  Neuro:  Grossly intact  Psych:  Alert, oriented and appropriate    Current Weight:   Weight (last 2 days)     Date/Time   Weight    10/18/18 0816  103 (227 2)                Lab Results:  I have personally reviewed pertinent labs  Results for orders placed or performed in visit on 10/18/18   POCT urine dip   Result Value Ref Range    LEUKOCYTE ESTERASE,UA neg     NITRITE,UA neg     SL AMB POCT UROBILINOGEN 2 0     SL AMB POCT URINE PROTEIN 30      PH,UA 5 0      BLOOD,UA neg     SPECIFIC GRAVITY,UA 1 025     KETONES,UA 5      BILIRUBIN,UA neg     GLUCOSE,       COLOR,UA orange      CLARITY,UA clear      The patient's microscopic exam was unremarkable except for few squamous epithelial cells, no RBCs or WBCs or casts or crystals          ASSESSMENT AND PLAN:  1  Hyponatremia:  Differential diagnosis would include the following:  · Hyperglycemia:  Initial 132 corrects for glucose 133 6, subsequent 134 sodium corrects for glucose 135 6 close to normal   · Large amount of oral fluid intake at least 1 gal or approximately 4 L; may be playing a partial role not complete role  This does not fit the excessive amount of primary polydipsia  · Endocrine:  Rule out adrenal insufficiency; thyroid disease has been ruled out with a recent normal TSH  · SIADH:  Possibly related to the Effexor, anxiety, rarely proton pump inhibitor, her other causes  Recommendations: Workup:  -urine for sodium  -urine for osmolality  -serum for osmolality  -a m  Cortisol  -uric acid  Potential further evaluation with CT scans of the head, chest abdomen pelvis if it is persistent or even borderline despite modest fluid restriction  Clinically the patient appears healthy except for mild obesity and diabetes mellitus    Treatment:  -modest fluid restriction of 1 5-2 L per day or 48 oz-64 oz  -potential future treatment with small dose of a loop diuretic depending upon his urine osmolality  -for now continue Effexor/Nexium as his sodium is close to normal and may normalized very easily with fluid restriction  -AVOID NSAIDS AS MUCH AS POSSIBLE  -AVOID THIAZIDE TYPE DIURETICS AS THIS WILL EXACERBATE THE HYPONATREMIA  -TREAT DIABETES MELLITUS PER YOUR DISCRETION  -ULTIMATE GOAL IS TO MAKE SURE SERUM SODIUM IS GREATER THAN OR EQUAL  CHRONICALLY CORRECTING FOR GLUCOSE    As outlined, further evaluation and treatment will be forthcoming depending upon the course and results of the above evaluation  2   Trace-1+ proteinuria on UA:  Probably related to diabetes mellitus/hyperglycemia  I would check a formal urine protein creatinine ratio  Further evaluation if warranted at this time  Goal is to maintain proteinuria less than 500-1000 mg  The Ace inhibitors an excellent agent  New    3  Hypertension:  Most likely essential in nature  He is only on a small dose of Zestril  His blood pressure in the office today is probably related to anxiety; although the Oscillimetric blood pressure was outstanding  I will have him purchase an Omron blood pressure machine intake readings at home   -goal is less than 135/85  -push exercise and diet    I have reviewed the above findings and plan with the patient  I reviewed the potential differential diagnosis  I have answered all questions  Patient Instructions   1  No medication changes today  2   PLEASE RESTRICT FLUID INTAKE TO A TOTAL OF 48-64 OZ PER DAY  IF YOU GET DEHYDRATED, USE ELECTROLYTE SOLUTIONS SUCH AS GATORADE/POWERADE WITHOUT THE GLUCOSE/SUGAR  3  Please go for lab work 1-2 weeks including the urine studies after doing the fluid restriction  This will be in the morning but nonfasting  4   Please go for lab work nonfasting in 1 month  5  Please purchase an Omron blood pressure machine:  Omron -10 series 785n through the Internet    Then take 1 week a blood pressure readings:  Morning and evening  The morning readings before taking any medication  The evening readings closer to bedtime  Then please send in the readings  Goal blood pressure is less than 135/85 at home  6  Follow-up in 3 months:  -please bring in 1 week a blood pressure readings morning and evening, sitting and standing  Please follow the instructions as listed above  In addition, when you stand please keep your arm elevated at heart level and not dangling   -please bring in your blood pressure machine for correlation with the office machine  -please go for fasting lab work prior to your appointment  7  General instructions:  -avoid salt  -avoid medications such as Motrin, Naprosyn, ibuprofen, Aleve or Advil or Celebrex as they can affect your kidney function; you can use Tylenol as needed for pain or fevers if you have no liver problems  -avoid medications such as Sudafed or other medications with decongestants as they can raise your blood pressure  -try to exercise at least 30 minutes at least 3 days a week with an ultimate goal of 5 days a week  -try to lose 5-10 lb by your next visit        Portions of the record may have been created with voice recognition software   Occasional wrong word or "sound a like" substitutions may have occurred due to the inherent limitations of voice recognition software   Read the chart carefully and recognize, using context, where substitutions have occurred      Heather Cohn MD

## 2018-10-18 NOTE — PROGRESS NOTES
Consultation - Nephrology 10/18/2018        History of Present Illness   Reason for Consult / Principal Problem:   Hyponatremia    HPI: Sobia Barron is a 51y o  year old male with a history of diabetes mellitus type 2, GERD and hypertension who we are asked to evaluate regarding hyponatremia:  Labs:  -sodium 140 as of 04/20/2009  -sodium 132 as of 10/03/2018 associated with a glucose of 211  -sodium 134 as of 10/10/2018 associated with glucose of 200  Liver function studies demonstrate a slightly elevated AST, total protein normal and albumin normal  Creatinine is normal  CBC is normal  -TSH 2 3 as of 10/03/2018  -UA:  No RBCs/no WBCs/no proteinuria  -micro albuminuria:  Normal  -PSA normal  Patient reports no history of hyponatremia in the past   He does drink 1 gal at least of water per day  He does not drink any significant alcohol  He does not particularly add salt to food  He does eat adequately any has a good appetite  He denies any NSAID use  He does take Nexium for GERD  He does take Effexor for IBS/anxiety  He denies any leg swelling or shortness of Breath  No change in weight it has been quite stable  History diabetes mellitus type 2 for approximately 3 years on oral medications  No overt retinopathy or neuropathy  General:   Overall feels well no fevers chills recent cough or colds and please see HPI  Cardiovascular:  No chest pain, no significant shortness of breath except when he is exercising which is just restarted, no leg swelling and no palpitations  Respiratory:  No wheezes or shortness of Breath except for above as outlined, no cough  Gastrointestinal:  Patient complains of occasional abdominal bloating and constipation associated with IBS diagnosis several years ago, no nausea or vomiting Halls  No black tarry stools or blood in the stools  Genitourinary:   Only occasional dysuria but nothing persistent, no blood in the urine no foam or bubbles or any other urinary symptoms or voiding problems  Neurology:  No neuropathy, occasional pain in his neck radiating to the head possibly positional it goes away, no dizziness or lightheaded when he stands  Rest of review of systems as completely reviewed with the patient are negative  Recent onset of bilateral back/sacral pain especially on different motions and positions possibly right greater than left    Historical Information   Past Medical History:   Diagnosis Date    Anxiety 9/12/2018    Diabetes mellitus ([address] )     GERD (gastroesophageal reflux disease)     Hypertension    ·  Hypertriglyceridemia  · No history of cancer/CVA/seizures/heart disease/asthma/emphysema/anemia/lung or liver problems other than mentioned, and no thyroid disease    Past Surgical History:   Procedure Laterality Date    LASIK Bilateral 2004    PILONIDAL CYST EXCISION  2011     Social History   History   Alcohol Use No     History   Drug Use No     History   Smoking Status    Never Smoker   Smokeless Tobacco    Never Used    Modestly salt restricted  Just started exercising last week 3 times a week    Family History   Problem Relation Age of Onset    Heart disease Father     Diabetes type II Brother         MELLITUS    Heart disease Brother     No family history of kidney disease  Father with hypertension, brother with hypertension    Meds/Allergies   all current active meds have been reviewed, current meds:   Current Outpatient Prescriptions:     ALPRAZolam (XANAX) 1 mg tablet, Take 1 mg by mouth daily at bedtime as needed for anxiety, Disp: , Rfl:     atorvastatin (LIPITOR) 20 mg tablet, Take 1 tablet (20 mg total) by mouth daily, Disp: 90 tablet, Rfl: 0    clobetasol (TEMOVATE) 0 05 % cream, Apply topically 2 (two) times a day, Disp: 30 g, Rfl: 2    clobetasol propionate (CLOBEX) 0 05 % shampoo, Apply topically 2 (two) times a day, Disp: 118 mL, Rfl: 2    Empagliflozin (JARDIANCE) 25 MG TABS, Take 1 tablet (25 mg total) by mouth every morning, Disp: 90 tablet, Rfl: 0    esomeprazole (NEXIUM) 20 mg capsule, Take 1 capsule by mouth Every 12 hours, Disp: , Rfl:     lisinopril (ZESTRIL) 5 mg tablet, Take 1 tablet by mouth every 24 hours, Disp: , Rfl:     metFORMIN (GLUCOPHAGE) 1000 MG tablet, Take 1 tablet (1,000 mg total) by mouth 2 (two) times a day with meals, Disp: 180 tablet, Rfl: 0    venlafaxine (EFFEXOR) 75 mg tablet, Take 75 mg by mouth 2 (two) times a day, Disp: , Rfl:     Allergies   Allergen Reactions    No Active Allergies        Objective   Vitals:    10/18/18 0816   BP: 106/69   Pulse: 84    Blood pressure on right sittin/90 with a heart rate of 80 and regular  Blood pressure sitting on left:  118/90  Blood pressure standing on left:  116/90 with a heart rate of 96 and regular  Body mass index is 29 98 kg/m²  General:  Well-developed well-nourished, mildly obese, no acute distress  Skin:  No acute rash  Eyes:  No scleral icterus and noninjected  ENT:  Normocephalic/atraumatic, mucous membranes moist  Neck:  Supple, no jugular venous distention, no carotid bruits, 1+ carotid upstroke, no thyromegaly  Back:  No CVA tenderness; no obvious spinal abnormality  Chest:  Clear to auscultation and percussion, good respiratory effort  CVS:  Regular rate and rhythm without a murmur rub or gallops appreciable  Abdomen:  Normal bowel sounds, mildly obese, no hepatosplenomegaly or bruits appreciable, no masses  Extremities:  No clubbing, no cyanosis, no edema,; no femoral bruits, 1+ dorsalis pedis pulses; joints appear normal  Neuro:  Grossly intact  Psych:  Alert, oriented and appropriate    Current Weight:   Weight (last 2 days)     Date/Time   Weight    10/18/18 0816  103 (227 2)                Lab Results:  I have personally reviewed pertinent labs    Results for orders placed or performed in visit on 10/18/18   POCT urine dip   Result Value Ref Range    LEUKOCYTE ESTERASE,UA neg     NITRITE,UA neg     SL AMB POCT UROBILINOGEN 2 0     SL AMB POCT URINE PROTEIN 30      PH,UA 5 0      BLOOD,UA neg     SPECIFIC GRAVITY,UA 1 025     KETONES,UA 5      BILIRUBIN,UA neg     GLUCOSE,       COLOR,UA orange      CLARITY,UA clear      The patient's microscopic exam was unremarkable except for few squamous epithelial cells, no RBCs or WBCs or casts or crystals          ASSESSMENT AND PLAN:  1  Hyponatremia:  Differential diagnosis would include the following:  · Hyperglycemia:  Initial 132 corrects for glucose 133 6, subsequent 134 sodium corrects for glucose 135 6 close to normal   · Large amount of oral fluid intake at least 1 gal or approximately 4 L; may be playing a partial role not complete role  This does not fit the excessive amount of primary polydipsia  · Endocrine:  Rule out adrenal insufficiency; thyroid disease has been ruled out with a recent normal TSH  · SIADH:  Possibly related to the Effexor, anxiety, rarely proton pump inhibitor, her other causes  Recommendations: Workup:  -urine for sodium  -urine for osmolality  -serum for osmolality  -a m  Cortisol  -uric acid  Potential further evaluation with CT scans of the head, chest abdomen pelvis if it is persistent or even borderline despite modest fluid restriction  Clinically the patient appears healthy except for mild obesity and diabetes mellitus  Treatment:  -modest fluid restriction of 1 5-2 L per day or 48 oz-64 oz  -potential future treatment with small dose of a loop diuretic depending upon his urine osmolality  -for now continue Effexor/Nexium as his sodium is close to normal and may normalized very easily with fluid restriction    -AVOID NSAIDS AS MUCH AS POSSIBLE  -AVOID THIAZIDE TYPE DIURETICS AS THIS WILL EXACERBATE THE HYPONATREMIA  -TREAT DIABETES MELLITUS PER YOUR DISCRETION  -ULTIMATE GOAL IS TO MAKE SURE SERUM SODIUM IS GREATER THAN OR EQUAL  CHRONICALLY CORRECTING FOR GLUCOSE    As outlined, further evaluation and treatment will be forthcoming depending upon the course and results of the above evaluation  2   Trace-1+ proteinuria on UA:  Probably related to diabetes mellitus/hyperglycemia  I would check a formal urine protein creatinine ratio  Further evaluation if warranted at this time  Goal is to maintain proteinuria less than 500-1000 mg  The Ace inhibitors an excellent agent  New    3  Hypertension:  Most likely essential in nature  He is only on a small dose of Zestril  His blood pressure in the office today is probably related to anxiety; although the Oscillimetric blood pressure was outstanding  I will have him purchase an Omron blood pressure machine intake readings at home   -goal is less than 135/85  -push exercise and diet    I have reviewed the above findings and plan with the patient  I reviewed the potential differential diagnosis  I have answered all questions  Patient Instructions   1  No medication changes today  2   PLEASE RESTRICT FLUID INTAKE TO A TOTAL OF 48-64 OZ PER DAY  IF YOU GET DEHYDRATED, USE ELECTROLYTE SOLUTIONS SUCH AS GATORADE/POWERADE WITHOUT THE GLUCOSE/SUGAR  3  Please go for lab work 1-2 weeks including the urine studies after doing the fluid restriction  This will be in the morning but nonfasting  4   Please go for lab work nonfasting in 1 month  5  Please purchase an Omron blood pressure machine:  Omron -10 series 785n through the Internet  Then take 1 week a blood pressure readings:  Morning and evening  The morning readings before taking any medication  The evening readings closer to bedtime  Then please send in the readings  Goal blood pressure is less than 135/85 at home  6  Follow-up in 3 months:  -please bring in 1 week a blood pressure readings morning and evening, sitting and standing  Please follow the instructions as listed above    In addition, when you stand please keep your arm elevated at heart level and not dangling   -please bring in your blood pressure machine for correlation with the office machine  -please go for fasting lab work prior to your appointment  7  General instructions:  -avoid salt  -avoid medications such as Motrin, Naprosyn, ibuprofen, Aleve or Advil or Celebrex as they can affect your kidney function; you can use Tylenol as needed for pain or fevers if you have no liver problems  -avoid medications such as Sudafed or other medications with decongestants as they can raise your blood pressure  -try to exercise at least 30 minutes at least 3 days a week with an ultimate goal of 5 days a week  -try to lose 5-10 lb by your next visit        Portions of the record may have been created with voice recognition software   Occasional wrong word or "sound a like" substitutions may have occurred due to the inherent limitations of voice recognition software   Read the chart carefully and recognize, using context, where substitutions have occurred      Arabella Dumont MD

## 2018-10-18 NOTE — PATIENT INSTRUCTIONS
1   No medication changes today  2   PLEASE RESTRICT FLUID INTAKE TO A TOTAL OF 48-64 OZ PER DAY  IF YOU GET DEHYDRATED, USE ELECTROLYTE SOLUTIONS SUCH AS GATORADE/POWERADE WITHOUT THE GLUCOSE/SUGAR  3  Please go for lab work 1-2 weeks including the urine studies after doing the fluid restriction  This will be in the morning but nonfasting  4   Please go for lab work nonfasting in 1 month  5  Please purchase an Omron blood pressure machine:  Omron -10 series 785n through the Internet  Then take 1 week a blood pressure readings:  Morning and evening  The morning readings before taking any medication  The evening readings closer to bedtime  Then please send in the readings  Goal blood pressure is less than 135/85 at home  6  Follow-up in 3 months:  -please bring in 1 week a blood pressure readings morning and evening, sitting and standing  Please follow the instructions as listed above  In addition, when you stand please keep your arm elevated at heart level and not dangling   -please bring in your blood pressure machine for correlation with the office machine  -please go for fasting lab work prior to your appointment  7  General instructions:  -avoid salt  -avoid medications such as Motrin, Naprosyn, ibuprofen, Aleve or Advil or Celebrex as they can affect your kidney function; you can use Tylenol as needed for pain or fevers if you have no liver problems  -avoid medications such as Sudafed or other medications with decongestants as they can raise your blood pressure  -try to exercise at least 30 minutes at least 3 days a week with an ultimate goal of 5 days a week  -try to lose 5-10 lb by your next visit

## 2018-12-31 ENCOUNTER — TELEPHONE (OUTPATIENT)
Dept: NEPHROLOGY | Facility: CLINIC | Age: 48
End: 2018-12-31

## 2018-12-31 NOTE — TELEPHONE ENCOUNTER
I called and left message for patient to call back to schedule February follow up appt with Dr Mainor Pacheco

## 2019-02-18 ENCOUNTER — TELEPHONE (OUTPATIENT)
Dept: NEPHROLOGY | Facility: CLINIC | Age: 49
End: 2019-02-18

## 2019-03-18 ENCOUNTER — TELEPHONE (OUTPATIENT)
Dept: NEPHROLOGY | Facility: CLINIC | Age: 49
End: 2019-03-18

## 2019-03-18 NOTE — TELEPHONE ENCOUNTER
I called patient to verify insurance  He does have [facility] now but is not sure whether or not he needs insurance referrals and theres no copy of card in system yet, he is going to call and verify with his pcp and have a referral faxed over if needed  Patient knows to bring in insurance card so I can scan it for his appt 3/21

## 2019-03-19 ENCOUNTER — APPOINTMENT (OUTPATIENT)
Dept: LAB | Facility: MEDICAL CENTER | Age: 49
End: 2019-03-19
Payer: COMMERCIAL

## 2019-03-19 ENCOUNTER — TELEPHONE (OUTPATIENT)
Dept: NEPHROLOGY | Facility: CLINIC | Age: 49
End: 2019-03-19

## 2019-03-19 DIAGNOSIS — I10 BENIGN ESSENTIAL HYPERTENSION: ICD-10-CM

## 2019-03-19 DIAGNOSIS — E87.1 HYPONATREMIA: ICD-10-CM

## 2019-03-19 DIAGNOSIS — E78.5 DYSLIPIDEMIA: ICD-10-CM

## 2019-03-19 LAB
ALBUMIN SERPL BCP-MCNC: 4.4 G/DL (ref 3.5–5)
ALP SERPL-CCNC: 62 U/L (ref 46–116)
ALT SERPL W P-5'-P-CCNC: 30 U/L (ref 12–78)
ANION GAP SERPL CALCULATED.3IONS-SCNC: 5 MMOL/L (ref 4–13)
AST SERPL W P-5'-P-CCNC: 18 U/L (ref 5–45)
BILIRUB SERPL-MCNC: 0.56 MG/DL (ref 0.2–1)
BUN SERPL-MCNC: 13 MG/DL (ref 5–25)
CALCIUM SERPL-MCNC: 9.3 MG/DL (ref 8.3–10.1)
CHLORIDE SERPL-SCNC: 103 MMOL/L (ref 100–108)
CO2 SERPL-SCNC: 27 MMOL/L (ref 21–32)
CREAT SERPL-MCNC: 0.88 MG/DL (ref 0.6–1.3)
CREAT UR-MCNC: 99 MG/DL
ERYTHROCYTE [DISTWIDTH] IN BLOOD BY AUTOMATED COUNT: 13.1 % (ref 11.6–15.1)
GFR SERPL CREATININE-BSD FRML MDRD: 101 ML/MIN/1.73SQ M
GLUCOSE P FAST SERPL-MCNC: 135 MG/DL (ref 65–99)
HCT VFR BLD AUTO: 46.7 % (ref 36.5–49.3)
HGB BLD-MCNC: 15.1 G/DL (ref 12–17)
MAGNESIUM SERPL-MCNC: 2.2 MG/DL (ref 1.6–2.6)
MCH RBC QN AUTO: 27.2 PG (ref 26.8–34.3)
MCHC RBC AUTO-ENTMCNC: 32.3 G/DL (ref 31.4–37.4)
MCV RBC AUTO: 84 FL (ref 82–98)
OSMOLALITY UR/SERPL-RTO: 297 MMOL/KG (ref 282–298)
OSMOLALITY UR: 864 MMOL/KG
PLATELET # BLD AUTO: 240 THOUSANDS/UL (ref 149–390)
PMV BLD AUTO: 10.9 FL (ref 8.9–12.7)
POTASSIUM SERPL-SCNC: 4 MMOL/L (ref 3.5–5.3)
PROT SERPL-MCNC: 7.8 G/DL (ref 6.4–8.2)
PROT UR-MCNC: 8 MG/DL
PROT/CREAT UR: 0.08 MG/G{CREAT} (ref 0–0.1)
RBC # BLD AUTO: 5.55 MILLION/UL (ref 3.88–5.62)
SODIUM 24H UR-SCNC: 84 MOL/L
SODIUM SERPL-SCNC: 135 MMOL/L (ref 136–145)
URATE SERPL-MCNC: 5.4 MG/DL (ref 4.2–8)
WBC # BLD AUTO: 8.88 THOUSAND/UL (ref 4.31–10.16)

## 2019-03-19 PROCEDURE — 84156 ASSAY OF PROTEIN URINE: CPT | Performed by: INTERNAL MEDICINE

## 2019-03-19 PROCEDURE — 82570 ASSAY OF URINE CREATININE: CPT | Performed by: INTERNAL MEDICINE

## 2019-03-19 PROCEDURE — 84300 ASSAY OF URINE SODIUM: CPT | Performed by: INTERNAL MEDICINE

## 2019-03-19 PROCEDURE — 83735 ASSAY OF MAGNESIUM: CPT

## 2019-03-19 PROCEDURE — 83930 ASSAY OF BLOOD OSMOLALITY: CPT

## 2019-03-19 PROCEDURE — 85027 COMPLETE CBC AUTOMATED: CPT

## 2019-03-19 PROCEDURE — 83935 ASSAY OF URINE OSMOLALITY: CPT | Performed by: INTERNAL MEDICINE

## 2019-03-19 PROCEDURE — 36415 COLL VENOUS BLD VENIPUNCTURE: CPT

## 2019-03-19 PROCEDURE — 84550 ASSAY OF BLOOD/URIC ACID: CPT

## 2019-03-19 PROCEDURE — 80053 COMPREHEN METABOLIC PANEL: CPT

## 2019-03-19 NOTE — TELEPHONE ENCOUNTER
----- Message from Leah Nuno MD sent at 3/19/2019  2:13 PM EDT -----  I do not see the results or that an a m  Cortisol was ordered    Please make sure that

## 2019-03-21 ENCOUNTER — OFFICE VISIT (OUTPATIENT)
Dept: NEPHROLOGY | Facility: CLINIC | Age: 49
End: 2019-03-21
Payer: COMMERCIAL

## 2019-03-21 VITALS — BODY MASS INDEX: 29.1 KG/M2 | HEIGHT: 73 IN | WEIGHT: 219.6 LBS

## 2019-03-21 DIAGNOSIS — R80.1 PERSISTENT PROTEINURIA: ICD-10-CM

## 2019-03-21 DIAGNOSIS — I10 BENIGN ESSENTIAL HYPERTENSION: ICD-10-CM

## 2019-03-21 DIAGNOSIS — E87.1 HYPONATREMIA: Primary | ICD-10-CM

## 2019-03-21 PROCEDURE — 3066F NEPHROPATHY DOC TX: CPT | Performed by: INTERNAL MEDICINE

## 2019-03-21 PROCEDURE — 99214 OFFICE O/P EST MOD 30 MIN: CPT | Performed by: INTERNAL MEDICINE

## 2019-03-21 NOTE — PROGRESS NOTES
RENAL FOLLOW UP NOTE: td    ASSESSMENT AND PLAN:  1  Hyponatremia: In part related to hyperglycemia, no evidence of thyroid disease, large amount of oral fluid intake, possible adrenal insufficiency needs to be ruled out still with pending a m  Cortisol, and 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/anxiety/rarely proton pump inhibitor and other causes  Finally rule out multiple myeloma given normal serum osmolality  Workup:  -urine for sodium:  84  -urine for osmolality:  864  -serum for osmolality:  297  -a m  Cortisol  -uric acid:  5 4 going against SIADH  -TSH :  2 3  Current sodium 135 along with glucose 135 so approximately normal at this point  Recommendations:  -fluid restriction 48-64 oz  -for now continue Effexor and/Nexium is sodium is closer normal  -avoid thiazide type diuretics that can exacerbate  -treat diabetes mellitus  -monitor sodium closely if it decreases going forward consider CT scan of the head chest abdomen pelvis  -potentially can add a small dose of torsemide if needed for hypertension which would help the increased urine osmolality/hyponatremia  -CHECK SPEP/UPEP/LIGHT CHAIN RATIO    2  Trace-1+ proteinuria on UA:  Most likely related to diabetes mellitus/hyperglycemia  -follow-up urine protein creatinine ratio 0 08 which is insignificant    3  Hypertension:  Most likely essential in nature  · Current blood pressure:  Typically 115 -120/80-90  · Goal blood pressure less 135/80  Recommendations:  · I Will have the patient send in 1 week a blood pressure reading  · If we needed another agent going forward, consideration for tiny dose of torsemide such as 5 mg daily    PATIENT INSTRUCTIONS:    Patient Instructions   1  No medication changes today  2  Please go for labs in the morning before 8:45    3   Please take and send in 1 week a blood pressure readings now:  Morning evening   -the morning readings are before taking any medications, in particular lisinopril  -the evening readings are closer to bedtime      4  Follow-up nonfasting labs any time of the day in 2 months    5  Follow-up in 4 months:  -please go for nonfasting labs prior to your appointment  -please bring in 1 week a blood pressure readings morning and evening, sitting and standing:  · Take the morning readings before any medications  · Take the evening readings closer to bedtime  · When taking standing readings, keep your arm supported at heart level and not dangling  -PLEASE BRING IN YOUR BLOOD PRESSURE MACHINE TO CORRELATE WITH THE OFFICE MACHINE    6  General instructions:  -avoid salt  -avoid medications such as Motrin, Naprosyn, ibuprofen, Aleve or Advil or Celebrex as they can affect your kidney function; you can use Tylenol as needed for pain or fevers if you have no liver problems  -avoid medications such as Sudafed or other medications with decongestants as they can raise your blood pressure  -try to exercise at least 30 minutes at least 3 days a week with an ultimate goal of 5 days a week  -try to lose 5-10 lb by your next visit  -PLEASE TRY TO STICK WITH A 48-64 OZ FLUID RESTRICTION, USE ELECTROLYTE SOLUTIONS SUCH AS GATORADE OR POWERADE IF NEEDED IF DEHYDRATED          Subjective: The patient overall is feeling well  No fevers chills cough or colds    Good appetite and good energy  No urinary symptoms including foamy urine or blood in the urine  No gastrointestinal symptoms  No cardiovascular symptoms including swelling of the legs  No headaches, dizziness or lightheadedness  Blood pressure medications:  -lisinopril 5 mg daily    ROS:  See HPI, otherwise review of systems as completely reviewed with the patient are negative    Past Medical History:   Diagnosis Date    Anxiety 9/12/2018    [facility] mellitus ([facility] [address] )     GERD (gastroesophageal reflux disease)     Hypertension      Past Surgical History:   Procedure Laterality Date    LASIK Bilateral 2004    PILONIDAL CYST EXCISION  2011     Family History   Problem Relation Age of Onset    Heart disease Father     Diabetes type II Brother         MELLITUS    Heart disease Brother       reports that he has never smoked  He has never used smokeless tobacco  He reports that he does not drink alcohol or use drugs  I COMPLETELY REVIEWED THE PAST MEDICAL HISTORY/PAST SURGICAL HISTORY/SOCIAL HISTORY/FAMILY HISTORY/AND MEDICATIONS  AND UPDATED ALL    Objective:     Vitals:   BP sitting on left:  116/90 with a heart rate of 92 and regular  BP standing on left:  112/88 with a heart rate of 100 regular    Weight (last 2 days)     Date/Time   Weight    03/21/19 0903   99 6 (219 6)            Wt Readings from Last 3 Encounters:   03/21/19 99 6 kg (219 lb 9 6 oz)   10/18/18 103 kg (227 lb 3 2 oz)   10/10/18 103 kg (226 lb)       Body mass index is 28 97 kg/m²      Physical Exam: General:  Mildly obese, but in No acute distress  Skin:  No acute rash  Eyes:  No scleral icterus, noninjected  ENT:  Moist mucous membranes  Neck:  Supple, no jugular venous distention  Back   No CVAT  Chest:  Clear to auscultation and percussion, good respiratory effort  CVS:  Regular rate and rhythm without a rub, murmurs or gallops  Abdomen:  Soft and nontender with normal bowel sounds  Extremities:  No cyanosis and no edema  Neuro:  Grossly intact  Psych:  Alert, oriented x3 and appropriate      Medications:    Current Outpatient Medications:     ALPRAZolam (XANAX) 1 mg tablet, Take 1 mg by mouth daily at bedtime as needed for anxiety, Disp: , Rfl:     atorvastatin (LIPITOR) 20 mg tablet, Take 1 tablet (20 mg total) by mouth daily, Disp: 90 tablet, Rfl: 0    clobetasol (TEMOVATE) 0 05 % cream, Apply topically 2 (two) times a day, Disp: 30 g, Rfl: 2    clobetasol propionate (CLOBEX) 0 05 % shampoo, Apply topically 2 (two) times a day, Disp: 118 mL, Rfl: 2    Empagliflozin (JARDIANCE) 25 MG TABS, Take 1 tablet (25 mg total) by mouth every morning, Disp: 90 tablet, Rfl: 0    esomeprazole (NEXIUM) 20 mg capsule, Take 1 capsule by mouth Every 12 hours, Disp: , Rfl:     lisinopril (ZESTRIL) 5 mg tablet, Take 1 tablet by mouth every 24 hours, Disp: , Rfl:     metFORMIN (GLUCOPHAGE) 1000 MG tablet, Take 1 tablet (1,000 mg total) by mouth 2 (two) times a day with meals, Disp: 180 tablet, Rfl: 0    venlafaxine (EFFEXOR) 75 mg tablet, Take 75 mg by mouth 2 (two) times a day, Disp: , Rfl:     Lab, Imaging and other studies: I have personally reviewed pertinent labs    Laboratory Results:  Results for orders placed or performed in visit on 03/19/19   Osmolality   Result Value Ref Range    Osmolality Serum 297 282 - 298 mmol/KG   Uric acid   Result Value Ref Range    Uric Acid 5 4 4 2 - 8 0 mg/dL   CBC   Result Value Ref Range    WBC 8 88 4 31 - 10 16 Thousand/uL    RBC 5 55 3 88 - 5 62 Million/uL    Hemoglobin 15 1 12 0 - 17 0 g/dL    Hematocrit 46 7 36 5 - 49 3 %    MCV 84 82 - 98 fL    MCH 27 2 26 8 - 34 3 pg    MCHC 32 3 31 4 - 37 4 g/dL    RDW 13 1 11 6 - 15 1 %    Platelets 461 591 - 951 Thousands/uL    MPV 10 9 8 9 - 12 7 fL   Magnesium   Result Value Ref Range    Magnesium 2 2 1 6 - 2 6 mg/dL   Comprehensive metabolic panel   Result Value Ref Range    Sodium 135 (L) 136 - 145 mmol/L    Potassium 4 0 3 5 - 5 3 mmol/L    Chloride 103 100 - 108 mmol/L    CO2 27 21 - 32 mmol/L    ANION GAP 5 4 - 13 mmol/L    BUN 13 5 - 25 mg/dL    Creatinine 0 88 0 60 - 1 30 mg/dL    Glucose, Fasting 135 (H) 65 - 99 mg/dL    Calcium 9 3 8 3 - 10 1 mg/dL    AST 18 5 - 45 U/L    ALT 30 12 - 78 U/L    Alkaline Phosphatase 62 46 - 116 U/L    Total Protein 7 8 6 4 - 8 2 g/dL    Albumin 4 4 3 5 - 5 0 g/dL    Total Bilirubin 0 56 0 20 - 1 00 mg/dL    eGFR 101 ml/min/1 73sq m       Results from last 7 days   Lab Units 03/19/19  0946   WBC Thousand/uL 8 88   HEMOGLOBIN g/dL 15 1   HEMATOCRIT % 46 7   PLATELETS Thousands/uL 240   POTASSIUM mmol/L 4 0   CHLORIDE mmol/L 103   CO2 mmol/L 27   BUN mg/dL 13   CREATININE mg/dL 0 88   CALCIUM mg/dL 9 3 MAGNESIUM mg/dL 2 2         Radiology review:   chest X-ray    Ultrasound      Portions of the record may have been created with voice recognition software  Occasional wrong word or "sound a like" substitutions may have occurred due to the inherent limitations of voice recognition software  Read the chart carefully and recognize, using context, where substitutions have occurred

## 2019-03-21 NOTE — PATIENT INSTRUCTIONS
1  No medication changes today  2  Please go for labs in the morning before 8:45    3  Please take and send in 1 week a blood pressure readings now:  Morning evening   -the morning readings are before taking any medications, in particular lisinopril  -the evening readings are closer to bedtime      4  Follow-up nonfasting labs any time of the day in 2 months    5  Follow-up in 4 months:  -please go for nonfasting labs prior to your appointment  -please bring in 1 week a blood pressure readings morning and evening, sitting and standing:  · Take the morning readings before any medications  · Take the evening readings closer to bedtime  · When taking standing readings, keep your arm supported at heart level and not dangling  -PLEASE BRING IN YOUR BLOOD PRESSURE MACHINE TO CORRELATE WITH THE OFFICE MACHINE    6  General instructions:  -avoid salt  -avoid medications such as Motrin, Naprosyn, ibuprofen, Aleve or Advil or Celebrex as they can affect your kidney function; you can use Tylenol as needed for pain or fevers if you have no liver problems  -avoid medications such as Sudafed or other medications with decongestants as they can raise your blood pressure  -try to exercise at least 30 minutes at least 3 days a week with an ultimate goal of 5 days a week  -try to lose 5-10 lb by your next visit  -PLEASE TRY TO STICK WITH A 48-64 OZ FLUID RESTRICTION, USE ELECTROLYTE SOLUTIONS SUCH AS GATORADE OR POWERADE IF NEEDED IF DEHYDRATED

## 2019-03-21 NOTE — LETTER
March 21, 2019     Dionte Dubon MD  [address] [facility]  [address]    Patient: Roddy Garcia   YOB: 1970   Date of Visit: 3/21/2019       Dear Dr Michelle Rodríguez: Thank you for referring Roddy Garcia to me for evaluation  Below are my notes for this consultation  If you have questions, please do not hesitate to call me  I look forward to following your patient along with you  Sincerely,        Ivana Hong MD        CC: No Recipients  Ivana Hong MD  3/21/2019  9:25 AM  Sign at close encounter  RENAL FOLLOW UP NOTE: td    ASSESSMENT AND PLAN:  1  Hyponatremia: In part related to hyperglycemia, no evidence of thyroid disease, large amount of oral fluid intake, possible adrenal insufficiency needs to be ruled out still with pending a m  Cortisol, and 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/anxiety/rarely proton pump inhibitor and other causes  Finally rule out multiple myeloma given normal serum osmolality  Workup:  -urine for sodium:  84  -urine for osmolality:  864  -serum for osmolality:  297  -a m  Cortisol  -uric acid:  5 4 going against SIADH  -TSH :  2 3  Current sodium 135 along with glucose 135 so approximately normal at this point  Recommendations:  -fluid restriction 48-64 oz  -for now continue Effexor and/Nexium is sodium is closer normal  -avoid thiazide type diuretics that can exacerbate  -treat diabetes mellitus  -monitor sodium closely if it decreases going forward consider CT scan of the head chest abdomen pelvis  -potentially can add a small dose of torsemide if needed for hypertension which would help the increased urine osmolality/hyponatremia  -CHECK SPEP/UPEP/LIGHT CHAIN RATIO    2  Trace-1+ proteinuria on UA:  Most likely related to diabetes mellitus/hyperglycemia  -follow-up urine protein creatinine ratio 0 08 which is insignificant    3  Hypertension:  Most likely essential in nature    · Current blood pressure:  Typically 115 -120/80-90  · Goal blood pressure less 135/80  Recommendations:  · I Will have the patient send in 1 week a blood pressure reading  · If we needed another agent going forward, consideration for tiny dose of torsemide such as 5 mg daily    PATIENT INSTRUCTIONS:    Patient Instructions   1  No medication changes today  2  Please go for labs in the morning before 8:45    3  Please take and send in 1 week a blood pressure readings now:  Morning evening   -the morning readings are before taking any medications, in particular lisinopril  -the evening readings are closer to bedtime      4  Follow-up nonfasting labs any time of the day in 2 months    5  Follow-up in 4 months:  -please go for nonfasting labs prior to your appointment  -please bring in 1 week a blood pressure readings morning and evening, sitting and standing:  · Take the morning readings before any medications  · Take the evening readings closer to bedtime  · When taking standing readings, keep your arm supported at heart level and not dangling  -PLEASE BRING IN YOUR BLOOD PRESSURE MACHINE TO CORRELATE WITH THE OFFICE MACHINE    6  General instructions:  -avoid salt  -avoid medications such as Motrin, Naprosyn, ibuprofen, Aleve or Advil or Celebrex as they can affect your kidney function; you can use Tylenol as needed for pain or fevers if you have no liver problems  -avoid medications such as Sudafed or other medications with decongestants as they can raise your blood pressure  -try to exercise at least 30 minutes at least 3 days a week with an ultimate goal of 5 days a week  -try to lose 5-10 lb by your next visit  -PLEASE TRY TO STICK WITH A 48-64 OZ FLUID RESTRICTION, USE ELECTROLYTE SOLUTIONS SUCH AS GATORADE OR POWERADE IF NEEDED IF DEHYDRATED          Subjective: The patient overall is feeling well  No fevers chills cough or colds    Good appetite and good energy  No urinary symptoms including foamy urine or blood in the urine  No gastrointestinal symptoms  No cardiovascular symptoms including swelling of the legs  No headaches, dizziness or lightheadedness  Blood pressure medications:  -lisinopril 5 mg daily    ROS:  See HPI, otherwise review of systems as completely reviewed with the patient are negative    Past Medical History:   Diagnosis Date    Anxiety 9/12/2018    Diabetes mellitus ([address] )     GERD (gastroesophageal reflux disease)     Hypertension      Past Surgical History:   Procedure Laterality Date    LASIK Bilateral 2004    PILONIDAL CYST EXCISION  2011     Family History   Problem Relation Age of Onset    Heart disease Father     Diabetes type II Brother         MELLITUS    Heart disease Brother       reports that he has never smoked  He has never used smokeless tobacco  He reports that he does not drink alcohol or use drugs  I COMPLETELY REVIEWED THE PAST MEDICAL HISTORY/PAST SURGICAL HISTORY/SOCIAL HISTORY/FAMILY HISTORY/AND MEDICATIONS  AND UPDATED ALL    Objective:     Vitals:   BP sitting on left:  116/90 with a heart rate of 92 and regular  BP standing on left:  112/88 with a heart rate of 100 regular    Weight (last 2 days)     Date/Time   Weight    03/21/19 0903   99 6 (219 6)            Wt Readings from Last 3 Encounters:   03/21/19 99 6 kg (219 lb 9 6 oz)   10/18/18 103 kg (227 lb 3 2 oz)   10/10/18 103 kg (226 lb)       Body mass index is 28 97 kg/m²      Physical Exam: General:  Mildly obese, but in No acute distress  Skin:  No acute rash  Eyes:  No scleral icterus, noninjected  ENT:  Moist mucous membranes  Neck:  Supple, no jugular venous distention  Back   No CVAT  Chest:  Clear to auscultation and percussion, good respiratory effort  CVS:  Regular rate and rhythm without a rub, murmurs or gallops  Abdomen:  Soft and nontender with normal bowel sounds  Extremities:  No cyanosis and no edema  Neuro:  Grossly intact  Psych:  Alert, oriented x3 and appropriate      Medications:    Current Outpatient Medications:     ALPRAZolam (XANAX) 1 mg tablet, Take 1 mg by mouth daily at bedtime as needed for anxiety, Disp: , Rfl:     atorvastatin (LIPITOR) 20 mg tablet, Take 1 tablet (20 mg total) by mouth daily, Disp: 90 tablet, Rfl: 0    clobetasol (TEMOVATE) 0 05 % cream, Apply topically 2 (two) times a day, Disp: 30 g, Rfl: 2    clobetasol propionate (CLOBEX) 0 05 % shampoo, Apply topically 2 (two) times a day, Disp: 118 mL, Rfl: 2    Empagliflozin (JARDIANCE) 25 MG TABS, Take 1 tablet (25 mg total) by mouth every morning, Disp: 90 tablet, Rfl: 0    esomeprazole (NEXIUM) 20 mg capsule, Take 1 capsule by mouth Every 12 hours, Disp: , Rfl:     lisinopril (ZESTRIL) 5 mg tablet, Take 1 tablet by mouth every 24 hours, Disp: , Rfl:     metFORMIN (GLUCOPHAGE) 1000 MG tablet, Take 1 tablet (1,000 mg total) by mouth 2 (two) times a day with meals, Disp: 180 tablet, Rfl: 0    venlafaxine (EFFEXOR) 75 mg tablet, Take 75 mg by mouth 2 (two) times a day, Disp: , Rfl:     Lab, Imaging and other studies: I have personally reviewed pertinent labs    Laboratory Results:  Results for orders placed or performed in visit on 03/19/19   Osmolality   Result Value Ref Range    Osmolality Serum 297 282 - 298 mmol/KG   Uric acid   Result Value Ref Range    Uric Acid 5 4 4 2 - 8 0 mg/dL   CBC   Result Value Ref Range    WBC 8 88 4 31 - 10 16 Thousand/uL    RBC 5 55 3 88 - 5 62 Million/uL    Hemoglobin 15 1 12 0 - 17 0 g/dL    Hematocrit 46 7 36 5 - 49 3 %    MCV 84 82 - 98 fL    MCH 27 2 26 8 - 34 3 pg    MCHC 32 3 31 4 - 37 4 g/dL    RDW 13 1 11 6 - 15 1 %    Platelets 664 496 - 311 Thousands/uL    MPV 10 9 8 9 - 12 7 fL   Magnesium   Result Value Ref Range    Magnesium 2 2 1 6 - 2 6 mg/dL   Comprehensive metabolic panel   Result Value Ref Range    Sodium 135 (L) 136 - 145 mmol/L    Potassium 4 0 3 5 - 5 3 mmol/L    Chloride 103 100 - 108 mmol/L    CO2 27 21 - 32 mmol/L    ANION GAP 5 4 - 13 mmol/L    BUN 13 5 - 25 mg/dL    Creatinine 0 88 0 60 - 1 30 mg/dL    Glucose, Fasting 135 (H) 65 - 99 mg/dL    Calcium 9 3 8 3 - 10 1 mg/dL    AST 18 5 - 45 U/L    ALT 30 12 - 78 U/L    Alkaline Phosphatase 62 46 - 116 U/L    Total Protein 7 8 6 4 - 8 2 g/dL    Albumin 4 4 3 5 - 5 0 g/dL    Total Bilirubin 0 56 0 20 - 1 00 mg/dL    eGFR 101 ml/min/1 73sq m       Results from last 7 days   Lab Units 03/19/19  0946   WBC Thousand/uL 8 88   HEMOGLOBIN g/dL 15 1   HEMATOCRIT % 46 7   PLATELETS Thousands/uL 240   POTASSIUM mmol/L 4 0   CHLORIDE mmol/L 103   CO2 mmol/L 27   BUN mg/dL 13   CREATININE mg/dL 0 88   CALCIUM mg/dL 9 3   MAGNESIUM mg/dL 2 2         Radiology review:   chest X-ray    Ultrasound      Portions of the record may have been created with voice recognition software  Occasional wrong word or "sound a like" substitutions may have occurred due to the inherent limitations of voice recognition software  Read the chart carefully and recognize, using context, where substitutions have occurred

## 2019-06-11 ENCOUNTER — TELEPHONE (OUTPATIENT)
Dept: FAMILY MEDICINE CLINIC | Facility: CLINIC | Age: 49
End: 2019-06-11

## 2019-07-09 ENCOUNTER — TRANSCRIBE ORDERS (OUTPATIENT)
Dept: ADMINISTRATIVE | Facility: HOSPITAL | Age: 49
End: 2019-07-09

## 2019-07-09 ENCOUNTER — APPOINTMENT (OUTPATIENT)
Dept: LAB | Facility: MEDICAL CENTER | Age: 49
End: 2019-07-09
Payer: COMMERCIAL

## 2019-07-09 DIAGNOSIS — I10 BENIGN ESSENTIAL HYPERTENSION: ICD-10-CM

## 2019-07-09 DIAGNOSIS — E87.1 HYPONATREMIA: ICD-10-CM

## 2019-07-09 DIAGNOSIS — D40.0 NEOPLASM OF UNCERTAIN BEHAVIOR OF PROSTATE: ICD-10-CM

## 2019-07-09 DIAGNOSIS — D40.0 NEOPLASM OF UNCERTAIN BEHAVIOR OF PROSTATE: Primary | ICD-10-CM

## 2019-07-09 DIAGNOSIS — R80.1 PERSISTENT PROTEINURIA: ICD-10-CM

## 2019-07-09 LAB
ALBUMIN SERPL BCP-MCNC: 4.4 G/DL (ref 3.5–5)
ALP SERPL-CCNC: 67 U/L (ref 46–116)
ALT SERPL W P-5'-P-CCNC: 23 U/L (ref 12–78)
ANION GAP SERPL CALCULATED.3IONS-SCNC: 6 MMOL/L (ref 4–13)
AST SERPL W P-5'-P-CCNC: 11 U/L (ref 5–45)
BILIRUB SERPL-MCNC: 0.57 MG/DL (ref 0.2–1)
BUN SERPL-MCNC: 14 MG/DL (ref 5–25)
CALCIUM SERPL-MCNC: 9.1 MG/DL (ref 8.3–10.1)
CHLORIDE SERPL-SCNC: 102 MMOL/L (ref 100–108)
CO2 SERPL-SCNC: 29 MMOL/L (ref 21–32)
CORTIS AM PEAK SERPL-MCNC: 8.5 UG/DL (ref 4.2–22.4)
CREAT SERPL-MCNC: 0.99 MG/DL (ref 0.6–1.3)
CREAT UR-MCNC: 91.4 MG/DL
ERYTHROCYTE [DISTWIDTH] IN BLOOD BY AUTOMATED COUNT: 13.1 % (ref 11.6–15.1)
GFR SERPL CREATININE-BSD FRML MDRD: 89 ML/MIN/1.73SQ M
GLUCOSE P FAST SERPL-MCNC: 160 MG/DL (ref 65–99)
HCT VFR BLD AUTO: 45.1 % (ref 36.5–49.3)
HGB BLD-MCNC: 14.4 G/DL (ref 12–17)
MAGNESIUM SERPL-MCNC: 2.1 MG/DL (ref 1.6–2.6)
MCH RBC QN AUTO: 27.1 PG (ref 26.8–34.3)
MCHC RBC AUTO-ENTMCNC: 31.9 G/DL (ref 31.4–37.4)
MCV RBC AUTO: 85 FL (ref 82–98)
PLATELET # BLD AUTO: 233 THOUSANDS/UL (ref 149–390)
PMV BLD AUTO: 10.9 FL (ref 8.9–12.7)
POTASSIUM SERPL-SCNC: 4.2 MMOL/L (ref 3.5–5.3)
PROT SERPL-MCNC: 7.8 G/DL (ref 6.4–8.2)
PROT UR-MCNC: 8 MG/DL
PROT/CREAT UR: 0.09 MG/G{CREAT} (ref 0–0.1)
PSA SERPL-MCNC: 0.3 NG/ML (ref 0–4)
RBC # BLD AUTO: 5.32 MILLION/UL (ref 3.88–5.62)
SODIUM SERPL-SCNC: 137 MMOL/L (ref 136–145)
WBC # BLD AUTO: 10.01 THOUSAND/UL (ref 4.31–10.16)

## 2019-07-09 PROCEDURE — 85027 COMPLETE CBC AUTOMATED: CPT

## 2019-07-09 PROCEDURE — 84156 ASSAY OF PROTEIN URINE: CPT

## 2019-07-09 PROCEDURE — 84165 PROTEIN E-PHORESIS SERUM: CPT | Performed by: PATHOLOGY

## 2019-07-09 PROCEDURE — 84166 PROTEIN E-PHORESIS/URINE/CSF: CPT | Performed by: INTERNAL MEDICINE

## 2019-07-09 PROCEDURE — 36415 COLL VENOUS BLD VENIPUNCTURE: CPT

## 2019-07-09 PROCEDURE — 83735 ASSAY OF MAGNESIUM: CPT

## 2019-07-09 PROCEDURE — 80053 COMPREHEN METABOLIC PANEL: CPT

## 2019-07-09 PROCEDURE — 82533 TOTAL CORTISOL: CPT

## 2019-07-09 PROCEDURE — 84153 ASSAY OF PSA TOTAL: CPT

## 2019-07-09 PROCEDURE — 82570 ASSAY OF URINE CREATININE: CPT

## 2019-07-09 PROCEDURE — 84165 PROTEIN E-PHORESIS SERUM: CPT

## 2019-07-09 PROCEDURE — 83883 ASSAY NEPHELOMETRY NOT SPEC: CPT

## 2019-07-09 PROCEDURE — 84166 PROTEIN E-PHORESIS/URINE/CSF: CPT | Performed by: PATHOLOGY

## 2019-07-10 ENCOUNTER — TELEPHONE (OUTPATIENT)
Dept: NEPHROLOGY | Facility: CLINIC | Age: 49
End: 2019-07-10

## 2019-07-10 DIAGNOSIS — E87.1 HYPONATREMIA: Primary | ICD-10-CM

## 2019-07-10 DIAGNOSIS — R80.1 PERSISTENT PROTEINURIA: ICD-10-CM

## 2019-07-10 DIAGNOSIS — I10 BENIGN ESSENTIAL HYPERTENSION: ICD-10-CM

## 2019-07-10 DIAGNOSIS — E78.5 DYSLIPIDEMIA: ICD-10-CM

## 2019-07-10 LAB
ALBUMIN SERPL ELPH-MCNC: 4.56 G/DL (ref 3.5–5)
ALBUMIN SERPL ELPH-MCNC: 62.4 % (ref 52–65)
ALBUMIN UR ELPH-MCNC: 100 %
ALPHA1 GLOB MFR UR ELPH: 0 %
ALPHA1 GLOB SERPL ELPH-MCNC: 0.25 G/DL (ref 0.1–0.4)
ALPHA1 GLOB SERPL ELPH-MCNC: 3.4 % (ref 2.5–5)
ALPHA2 GLOB MFR UR ELPH: 0 %
ALPHA2 GLOB SERPL ELPH-MCNC: 0.6 G/DL (ref 0.4–1.2)
ALPHA2 GLOB SERPL ELPH-MCNC: 8.2 % (ref 7–13)
B-GLOBULIN MFR UR ELPH: 0 %
BETA GLOB ABNORMAL SERPL ELPH-MCNC: 0.49 G/DL (ref 0.4–0.8)
BETA1 GLOB SERPL ELPH-MCNC: 6.7 % (ref 5–13)
BETA2 GLOB SERPL ELPH-MCNC: 5.5 % (ref 2–8)
BETA2+GAMMA GLOB SERPL ELPH-MCNC: 0.4 G/DL (ref 0.2–0.5)
GAMMA GLOB ABNORMAL SERPL ELPH-MCNC: 1.01 G/DL (ref 0.5–1.6)
GAMMA GLOB MFR UR ELPH: 0 %
GAMMA GLOB SERPL ELPH-MCNC: 13.8 % (ref 12–22)
IGG/ALB SER: 1.66 {RATIO} (ref 1.1–1.8)
PROT PATTERN SERPL ELPH-IMP: NORMAL
PROT PATTERN UR ELPH-IMP: NORMAL
PROT SERPL-MCNC: 7.3 G/DL (ref 6.4–8.2)
PROT UR-MCNC: 8 MG/DL

## 2019-07-10 NOTE — TELEPHONE ENCOUNTER
It would seem it is however this is exactly what we need:  1  Initial cortisol and ACTH levels drawn at times 0  2  Right after give 1 amp of cortrosyn  0 25 mcg IV push  3  Exactly 30 minutes later draw a 2nd cortisol level  4   Exactly 60 minutes later draw a 3rd cortisol level  Please verify this with a lab

## 2019-07-10 NOTE — TELEPHONE ENCOUNTER
The cortisol stimulation test is under labs , code [id number]  I will call patient and make him aware he need to have lab done

## 2019-07-10 NOTE — TELEPHONE ENCOUNTER
----- Message from Mima Mann MD sent at 7/9/2019  2:04 PM EDT -----  The patient needs a Cortrosyn stimulation because of a borderline a m   Cortisol level this is not done through [facility] as of yet

## 2019-07-11 LAB
KAPPA LC FREE SER-MCNC: 16.5 MG/L (ref 3.3–19.4)
KAPPA LC FREE/LAMBDA FREE SER: 1.23 {RATIO} (ref 0.26–1.65)
LAMBDA LC FREE SERPL-MCNC: 13.4 MG/L (ref 5.7–26.3)

## 2019-07-11 NOTE — TELEPHONE ENCOUNTER
I called patient left message stating that Dr nicole would like pt to have a cortrosyn stimulation test a lab slip was mailed to him and the lab knows directions to follow

## 2019-09-04 NOTE — PROGRESS NOTES
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/anxiety/rarely proton pump inhibitor and other causes  A Cortrosyn stim was ordered but he never perform the study to definitively rule out adrenal insufficiency with an a m  Cortisol level of 8 5  Workup:  -urine for sodium:  84  -urine for osmolality:  864  -serum for osmolality:  297  -a m   Cortisol:  8 5, a Cortrosyn stimulation was ordered but not done at this time  -uric acid:  5 4 going against SIADH  -TSH :  2 3  -negative multiple myeloma  Current sodium:  137 which is normal and actually corrects to 138-139 given hyperglycemia  Recommendations: Workup:  -I would repeat an a m  Cortisol now that his sodium is normal   If it is persistently low then I would do a Cortrosyn stimulation test   -consideration for potential CT scan of the head/chest abdomen pelvis if the sodium persists significantly below normal  Treatment:  -fluid restriction 48-64 oz  -for now continue Effexor and Nexium:  -avoid thiazide type diuretics that can exacerbate  -treat diabetes mellitus  -potentially can add a small dose of torsemide if needed for hypertension which would help the increased urine osmolality/hyponatremia     2  Trace-1+ proteinuria on UA:  Most likely related to diabetes mellitus/hyperglycemia  -follow-up urine protein creatinine ratio 0 08 which is insignificant     3  Hypertension:  Most likely essential in nature  · Current blood pressure:  is low in the office today  We do not have any recent readings    · Goal blood pressure less 135/85  Recommendations:  · Push nonmedical regimen including weight loss, isotonic exercise and low sodium diet  · Medication changes today:  None at this time but I will have the patient send in 1 week a blood pressure readings and correlate his blood pressure machine with the office machine  Should it be low, consideration for stopping lisinopril specially with lack of proteinuria and occasional orthostatic symptoms  PATIENT INSTRUCTIONS:    Patient Instructions   1  No medication changes today  2  Please go for nonfasting lab work in the morning in the next 1-2 weeks at your convenience    3  Please take 1 week a blood pressure readings now morning and evening just sitting:  -the morning readings before taking any medications  -the evening readings before supper  Then please bring in these readings along with your blood pressure machine to correlate with the office machine; we will set up an appointment with 1 of my advanced practitioner's at that time  If your blood pressure is low we may consider stopping lisinopril at that time  4  Please go for lab work in 3 months nonfasting    5  Follow-up in 6 months:  -please bring in 1 week a blood pressure readings morning and evening, sitting and standing   · Take the morning readings before any medications  · Take the evening readings before supper  · When taking standing readings, keep your arm supported at heart level and not dangling  · Make sure you are sitting with your back supported in feet on the ground on crossed  · Make sure you have not taken any coffee or caffeine products or exercised or smoke cigarettes at least 30 minutes before taking your blood pressure  -please go for morning lab work but nonfasting    6  General instructions:  -AVOID SALT BUT NOT ADDING AN READING LABELS TO MAKE SURE THERE IS LOW-SALT IN THE FOOD THAT YOU ARE EATING  -avoid nonsteroidal anti-inflammatory drugs such as Naprosyn, ibuprofen, Aleve, Advil, Celebrex, etc   You can use Tylenol as needed if you do not have any liver condition to be concerned about  -avoid medications such as Sudafed or decongestants and antihistamines that contained the D component which is the decongestant    You can take antihistamines without the decongestant or D component   -try to exercise at least 30 minutes 3 days a week to begin with with an ultimate goal of 5 days a week for at least 30 minutes  -try to lose 5-10 lb by your next visit  In regards to the low sodium, continue modest fluid restriction of 48-64 oz; use Gatorade or any other electrolyte solution if dehydrated  Subjective: The patient overall is feeling well  No fevers chills cough or colds  Good appetite and good energy  No urinary symptoms including foamy urine or blood in the urine  No gastrointestinal symptoms  No cardiovascular symptoms including swelling of the legs  No headaches, dizziness or lightheadedness unless he is sitting at the edge of the chair and then stands up quickly  Blood pressure medications:  -lisinopril 5 mg daily in the morning      ROS:  See HPI, otherwise review of systems as completely reviewed with the patient are negative    Past Medical History:   Diagnosis Date    Anxiety 9/12/2018    Diabetes mellitus ([address] )     GERD (gastroesophageal reflux disease)     Hypertension      Past Surgical History:   Procedure Laterality Date    LASIK Bilateral 2004    PILONIDAL CYST EXCISION  2011     Family History   Problem Relation Age of Onset    Heart disease Father     Diabetes type II Brother         MELLITUS    Heart disease Brother       reports that he has never smoked  He has never used smokeless tobacco  He reports that he does not drink alcohol or use drugs      I COMPLETELY REVIEWED THE PAST MEDICAL HISTORY/PAST SURGICAL HISTORY/SOCIAL HISTORY/FAMILY HISTORY/AND MEDICATIONS  AND UPDATED ALL    Objective:     Vitals:   Vitals:    09/05/19 0753   BP: 104/62   Pulse: 100    BP sitting on left arm:  106/80 with a heart rate of 96 and regular  BP standing on left:  108/80 with a heart rate of 96 and regular    Weight (last 2 days)     Date/Time   Weight    09/05/19 0753   98 4 (217)            Wt Readings from Last 3 Encounters:   09/05/19 98 4 kg (217 lb) 03/21/19 99 6 kg (219 lb 9 6 oz)   10/18/18 103 kg (227 lb 3 2 oz)       Body mass index is 28 63 kg/m²  Physical Exam: General:  No acute distress  Skin:  No acute rash  Eyes:  No scleral icterus, noninjected  ENT:  Moist mucous membranes  Neck:  Supple, no jugular venous distention  Back   No CVAT  Chest:  Clear to auscultation and percussion, good respiratory effort  CVS:  Regular rate and rhythm without a rub, murmurs or gallops  Abdomen:  Soft and nontender with normal bowel sounds  Extremities:  No cyanosis and no edema  Neuro:  Grossly intact  Psych:  Alert, oriented x3 and appropriate      Medications:    Current Outpatient Medications:     ALPRAZolam (XANAX) 1 mg tablet, Take 1 mg by mouth daily at bedtime as needed for anxiety, Disp: , Rfl:     atorvastatin (LIPITOR) 20 mg tablet, Take 1 tablet (20 mg total) by mouth daily, Disp: 90 tablet, Rfl: 0    clobetasol (TEMOVATE) 0 05 % cream, Apply topically 2 (two) times a day, Disp: 30 g, Rfl: 2    Empagliflozin (JARDIANCE) 25 MG TABS, Take 1 tablet (25 mg total) by mouth every morning, Disp: 90 tablet, Rfl: 0    esomeprazole (NEXIUM) 20 mg capsule, Take 1 capsule by mouth Every 12 hours, Disp: , Rfl:     lisinopril (ZESTRIL) 5 mg tablet, Take 1 tablet by mouth every 24 hours, Disp: , Rfl:     metFORMIN (GLUCOPHAGE) 1000 MG tablet, Take 1 tablet (1,000 mg total) by mouth 2 (two) times a day with meals, Disp: 180 tablet, Rfl: 0    venlafaxine (EFFEXOR) 75 mg tablet, Take 75 mg by mouth 2 (two) times a day, Disp: , Rfl:     clobetasol propionate (CLOBEX) 0 05 % shampoo, Apply topically 2 (two) times a day (Patient not taking: Reported on 9/5/2019), Disp: 118 mL, Rfl: 2    Lab, Imaging and other studies: I have personally reviewed pertinent labs    Laboratory Results:  Results for orders placed or performed in visit on 07/09/19   Cortisol Level, AM Specimen   Result Value Ref Range    Cortisol - AM 8 5 4 2 - 22 4 ug/dL   Immunoglobulin free LT chains blood   Result Value Ref Range    Ig Kappa Free Light Chain 16 5 3 3 - 19 4 mg/L    Ig Lambda Free Light Chain 13 4 5 7 - 26 3 mg/L    Kappa/Lambda FluidC Ratio 1 23 0 26 - 1 65   Protein electrophoresis, serum   Result Value Ref Range    A/G Ratio 1 66 1 10 - 1 80    Albumin Electrophoresis 62 4 52 0 - 65 0 %    Albumin CONC 4 56 3 50 - 5 00 g/dl    Alpha 1 3 4 2 5 - 5 0 %    ALPHA 1 CONC 0 25 0 10 - 0 40 g/dL    Alpha 2 8 2 7 0 - 13 0 %    ALPHA 2 CONC 0 60 0 40 - 1 20 g/dL    Beta-1 6 7 5 0 - 13 0 %    BETA 1 CONC 0 49 0 40 - 0 80 g/dL    Beta-2 5 5 2 0 - 8 0 %    BETA 2 CONC 0 40 0 20 - 0 50 g/dL    Gamma Globulin 13 8 12 0 - 22 0 %    GAMMA CONC 1 01 0 50 - 1 60 g/dL    Total Protein 7 3 6 4 - 8 2 g/dL    SPEP Interpretation       No monoclonal bands noted  Reviewed by: Wilhelmenia Spire [place], MD, PhD ([id number]) **Electronic Signature**   Comprehensive metabolic panel   Result Value Ref Range    Sodium 137 136 - 145 mmol/L    Potassium 4 2 3 5 - 5 3 mmol/L    Chloride 102 100 - 108 mmol/L    CO2 29 21 - 32 mmol/L    ANION GAP 6 4 - 13 mmol/L    BUN 14 5 - 25 mg/dL    Creatinine 0 99 0 60 - 1 30 mg/dL    Glucose, Fasting 160 (H) 65 - 99 mg/dL    Calcium 9 1 8 3 - 10 1 mg/dL    AST 11 5 - 45 U/L    ALT 23 12 - 78 U/L    Alkaline Phosphatase 67 46 - 116 U/L    Total Protein 7 8 6 4 - 8 2 g/dL    Albumin 4 4 3 5 - 5 0 g/dL    Total Bilirubin 0 57 0 20 - 1 00 mg/dL    eGFR 89 ml/min/1 73sq m   CBC   Result Value Ref Range    WBC 10 01 4 31 - 10 16 Thousand/uL    RBC 5 32 3 88 - 5 62 Million/uL    Hemoglobin 14 4 12 0 - 17 0 g/dL    Hematocrit 45 1 36 5 - 49 3 %    MCV 85 82 - 98 fL    MCH 27 1 26 8 - 34 3 pg    MCHC 31 9 31 4 - 37 4 g/dL    RDW 13 1 11 6 - 15 1 %    Platelets 840 930 - 601 Thousands/uL    MPV 10 9 8 9 - 12 7 fL   Magnesium   Result Value Ref Range    Magnesium 2 1 1 6 - 2 6 mg/dL   Protein / creatinine ratio, urine   Result Value Ref Range    Creatinine, Ur 91 4 mg/dL    Protein Urine Random 8 mg/dL Prot/Creat Ratio, Ur 0 09 0 00 - 0 10   PSA Total, Diagnostic   Result Value Ref Range    PSA, Diagnostic 0 3 0 0 - 4 0 ng/mL             Invalid input(s): ALBUMIN      Radiology review:   chest X-ray    Ultrasound      Portions of the record may have been created with voice recognition software  Occasional wrong word or "sound a like" substitutions may have occurred due to the inherent limitations of voice recognition software  Read the chart carefully and recognize, using context, where substitutions have occurred

## 2019-09-05 ENCOUNTER — OFFICE VISIT (OUTPATIENT)
Dept: NEPHROLOGY | Facility: CLINIC | Age: 49
End: 2019-09-05

## 2019-09-05 VITALS
HEART RATE: 100 BPM | SYSTOLIC BLOOD PRESSURE: 104 MMHG | DIASTOLIC BLOOD PRESSURE: 62 MMHG | BODY MASS INDEX: 28.76 KG/M2 | HEIGHT: 73 IN | WEIGHT: 217 LBS

## 2019-09-05 DIAGNOSIS — E11.8 TYPE 2 DIABETES MELLITUS WITH COMPLICATION, UNSPECIFIED WHETHER LONG TERM INSULIN USE: ICD-10-CM

## 2019-09-05 DIAGNOSIS — I10 BENIGN ESSENTIAL HYPERTENSION: ICD-10-CM

## 2019-09-05 DIAGNOSIS — E87.1 HYPONATREMIA: Primary | ICD-10-CM

## 2019-09-05 PROCEDURE — 99214 OFFICE O/P EST MOD 30 MIN: CPT | Performed by: INTERNAL MEDICINE

## 2019-09-05 RX ORDER — EMPAGLIFLOZIN 25 MG/1
TABLET, FILM COATED ORAL
Qty: 30 TABLET | Refills: 0 | Status: SHIPPED | OUTPATIENT
Start: 2019-09-05 | End: 2019-10-16 | Stop reason: SDUPTHER

## 2019-09-05 NOTE — TELEPHONE ENCOUNTER
Patient is diabetic not seen since March she had multiple conslation will give him 1 month supply a if the no up ointment schedule no refill for the medication any more

## 2019-09-05 NOTE — LETTER
September 5, 2019     Rachael Fabry, MD  [address] [facility]  [address]    Patient: Krystal Solano   YOB: 1970   Date of Visit: 9/5/2019       Dear Dr Marley Mills: Thank you for referring Krystal Solano to me for evaluation  Below are my notes for this consultation  If you have questions, please do not hesitate to call me  I look forward to following your patient along with you  Sincerely,        Ellen Dubin, MD        CC: No Recipients  Ellen Dubin, MD  9/5/2019  8:17 AM  Sign at close encounter  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/anxiety/rarely proton pump inhibitor and other causes  A Cortrosyn stim was ordered but he never perform the study to definitively rule out adrenal insufficiency with an a m  Cortisol level of 8 5  Workup:  -urine for sodium:  84  -urine for osmolality:  864  -serum for osmolality:  297  -a m   Cortisol:  8 5, a Cortrosyn stimulation was ordered but not done at this time  -uric acid:  5 4 going against SIADH  -TSH :  2 3  -negative multiple myeloma  Current sodium:  137 which is normal and actually corrects to 138-139 given hyperglycemia  Recommendations: Workup:  -I would repeat an a m  Cortisol now that his sodium is normal   If it is persistently low then I would do a Cortrosyn stimulation test   -consideration for potential CT scan of the head/chest abdomen pelvis if the sodium persists significantly below normal  Treatment:  -fluid restriction 48-64 oz  -for now continue Effexor and Nexium:  -avoid thiazide type diuretics that can exacerbate  -treat diabetes mellitus  -potentially can add a small dose of torsemide if needed for hypertension which would help the increased urine osmolality/hyponatremia     2   Trace-1+ proteinuria on UA:  Most likely related to diabetes mellitus/hyperglycemia  -follow-up urine protein creatinine ratio 0 08 which is insignificant     3  Hypertension:  Most likely essential in nature  · Current blood pressure:   is low in the office today  We do not have any recent readings  · Goal blood pressure less 135/85  Recommendations:  · Push nonmedical regimen including weight loss, isotonic exercise and low sodium diet  · Medication changes today:  None at this time but I will have the patient send in 1 week a blood pressure readings and correlate his blood pressure machine with the office machine  Should it be low, consideration for stopping lisinopril specially with lack of proteinuria and occasional orthostatic symptoms  PATIENT INSTRUCTIONS:    Patient Instructions   1  No medication changes today  2  Please go for nonfasting lab work in the morning in the next 1-2 weeks at your convenience    3  Please take 1 week a blood pressure readings now morning and evening just sitting:  -the morning readings before taking any medications  -the evening readings before supper  Then please bring in these readings along with your blood pressure machine to correlate with the office machine; we will set up an appointment with 1 of my advanced practitioner's at that time  If your blood pressure is low we may consider stopping lisinopril at that time  4  Please go for lab work in 3 months nonfasting    5   Follow-up in 6 months:  -please bring in 1 week a blood pressure readings morning and evening, sitting and standing   · Take the morning readings before any medications  · Take the evening readings before supper  · When taking standing readings, keep your arm supported at heart level and not dangling  · Make sure you are sitting with your back supported in feet on the ground on crossed  · Make sure you have not taken any coffee or caffeine products or exercised or smoke cigarettes at least 30 minutes before taking your blood pressure  -please go for morning lab work but nonfasting    6  General instructions:  -AVOID SALT BUT NOT ADDING AN READING LABELS TO MAKE SURE THERE IS LOW-SALT IN THE FOOD THAT YOU ARE EATING  -avoid nonsteroidal anti-inflammatory drugs such as Naprosyn, ibuprofen, Aleve, Advil, Celebrex, etc   You can use Tylenol as needed if you do not have any liver condition to be concerned about  -avoid medications such as Sudafed or decongestants and antihistamines that contained the D component which is the decongestant  You can take antihistamines without the decongestant or D component   -try to exercise at least 30 minutes 3 days a week to begin with with an ultimate goal of 5 days a week for at least 30 minutes  -try to lose 5-10 lb by your next visit  In regards to the low sodium, continue modest fluid restriction of 48-64 oz; use Gatorade or any other electrolyte solution if dehydrated  Subjective: The patient overall is feeling well  No fevers chills cough or colds  Good appetite and good energy  No urinary symptoms including foamy urine or blood in the urine  No gastrointestinal symptoms  No cardiovascular symptoms including swelling of the legs  No headaches, dizziness or lightheadedness unless he is sitting at the edge of the chair and then stands up quickly  Blood pressure medications:  -lisinopril 5 mg daily in the morning      ROS:  See HPI, otherwise review of systems as completely reviewed with the patient are negative    Past Medical History:   Diagnosis Date    Anxiety 9/12/2018    Diabetes mellitus ([facility] [address] )     GERD (gastroesophageal reflux disease)     Hypertension      Past Surgical History:   Procedure Laterality Date    LASIK Bilateral 2004    PILONIDAL CYST EXCISION  2011     Family History   Problem Relation Age of Onset    Heart disease Father     Diabetes type II Brother         MELLITUS    Heart disease Brother       reports that he has never smoked   He has never used smokeless tobacco  He reports that he does not drink alcohol or use drugs  I COMPLETELY REVIEWED THE PAST MEDICAL HISTORY/PAST SURGICAL HISTORY/SOCIAL HISTORY/FAMILY HISTORY/AND MEDICATIONS  AND UPDATED ALL    Objective:     Vitals:   Vitals:    09/05/19 0753   BP: 104/62   Pulse: 100    BP sitting on left arm:  106/80 with a heart rate of 96 and regular  BP standing on left:  108/80 with a heart rate of 96 and regular    Weight (last 2 days)     Date/Time   Weight    09/05/19 0753   98 4 (217)            Wt Readings from Last 3 Encounters:   09/05/19 98 4 kg (217 lb)   03/21/19 99 6 kg (219 lb 9 6 oz)   10/18/18 103 kg (227 lb 3 2 oz)       Body mass index is 28 63 kg/m²      Physical Exam: General:  No acute distress  Skin:  No acute rash  Eyes:  No scleral icterus, noninjected  ENT:  Moist mucous membranes  Neck:  Supple, no jugular venous distention  Back   No CVAT  Chest:  Clear to auscultation and percussion, good respiratory effort  CVS:  Regular rate and rhythm without a rub, murmurs or gallops  Abdomen:  Soft and nontender with normal bowel sounds  Extremities:  No cyanosis and no edema  Neuro:  Grossly intact  Psych:  Alert, oriented x3 and appropriate      Medications:    Current Outpatient Medications:     ALPRAZolam (XANAX) 1 mg tablet, Take 1 mg by mouth daily at bedtime as needed for anxiety, Disp: , Rfl:     atorvastatin (LIPITOR) 20 mg tablet, Take 1 tablet (20 mg total) by mouth daily, Disp: 90 tablet, Rfl: 0    clobetasol (TEMOVATE) 0 05 % cream, Apply topically 2 (two) times a day, Disp: 30 g, Rfl: 2    Empagliflozin (JARDIANCE) 25 MG TABS, Take 1 tablet (25 mg total) by mouth every morning, Disp: 90 tablet, Rfl: 0    esomeprazole (NEXIUM) 20 mg capsule, Take 1 capsule by mouth Every 12 hours, Disp: , Rfl:     lisinopril (ZESTRIL) 5 mg tablet, Take 1 tablet by mouth every 24 hours, Disp: , Rfl:     metFORMIN (GLUCOPHAGE) 1000 MG tablet, Take 1 tablet (1,000 mg total) by mouth 2 (two) times a day with meals, Disp: 180 tablet, Rfl: 0    venlafaxine (EFFEXOR) 75 mg tablet, Take 75 mg by mouth 2 (two) times a day, Disp: , Rfl:     clobetasol propionate (CLOBEX) 0 05 % shampoo, Apply topically 2 (two) times a day (Patient not taking: Reported on 9/5/2019), Disp: 118 mL, Rfl: 2    Lab, Imaging and other studies: I have personally reviewed pertinent labs  Laboratory Results:  Results for orders placed or performed in visit on 07/09/19   Cortisol Level, AM Specimen   Result Value Ref Range    Cortisol - AM 8 5 4 2 - 22 4 ug/dL   Immunoglobulin free LT chains blood   Result Value Ref Range    Ig Kappa Free Light Chain 16 5 3 3 - 19 4 mg/L    Ig Lambda Free Light Chain 13 4 5 7 - 26 3 mg/L    Kappa/Lambda FluidC Ratio 1 23 0 26 - 1 65   Protein electrophoresis, serum   Result Value Ref Range    A/G Ratio 1 66 1 10 - 1 80    Albumin Electrophoresis 62 4 52 0 - 65 0 %    Albumin CONC 4 56 3 50 - 5 00 g/dl    Alpha 1 3 4 2 5 - 5 0 %    ALPHA 1 CONC 0 25 0 10 - 0 40 g/dL    Alpha 2 8 2 7 0 - 13 0 %    ALPHA 2 CONC 0 60 0 40 - 1 20 g/dL    Beta-1 6 7 5 0 - 13 0 %    BETA 1 CONC 0 49 0 40 - 0 80 g/dL    Beta-2 5 5 2 0 - 8 0 %    BETA 2 CONC 0 40 0 20 - 0 50 g/dL    Gamma Globulin 13 8 12 0 - 22 0 %    GAMMA CONC 1 01 0 50 - 1 60 g/dL    Total Protein 7 3 6 4 - 8 2 g/dL    SPEP Interpretation       No monoclonal bands noted  Reviewed by: [facility], MD, PhD ([id number]) **Electronic Signature**   Comprehensive metabolic panel   Result Value Ref Range    Sodium 137 136 - 145 mmol/L    Potassium 4 2 3 5 - 5 3 mmol/L    Chloride 102 100 - 108 mmol/L    CO2 29 21 - 32 mmol/L    ANION GAP 6 4 - 13 mmol/L    BUN 14 5 - 25 mg/dL    Creatinine 0 99 0 60 - 1 30 mg/dL    Glucose, Fasting 160 (H) 65 - 99 mg/dL    Calcium 9 1 8 3 - 10 1 mg/dL    AST 11 5 - 45 U/L    ALT 23 12 - 78 U/L    Alkaline Phosphatase 67 46 - 116 U/L    Total Protein 7 8 6 4 - 8 2 g/dL    Albumin 4 4 3 5 - 5 0 g/dL    Total Bilirubin 0 57 0 20 - 1 00 mg/dL    eGFR 89 ml/min/1 73sq m   CBC   Result Value Ref Range    WBC 10 01 4 31 - 10 16 Thousand/uL    RBC 5 32 3 88 - 5 62 Million/uL    Hemoglobin 14 4 12 0 - 17 0 g/dL    Hematocrit 45 1 36 5 - 49 3 %    MCV 85 82 - 98 fL    MCH 27 1 26 8 - 34 3 pg    MCHC 31 9 31 4 - 37 4 g/dL    RDW 13 1 11 6 - 15 1 %    Platelets 540 874 - 473 Thousands/uL    MPV 10 9 8 9 - 12 7 fL   Magnesium   Result Value Ref Range    Magnesium 2 1 1 6 - 2 6 mg/dL   Protein / creatinine ratio, urine   Result Value Ref Range    Creatinine, Ur 91 4 mg/dL    Protein Urine Random 8 mg/dL    Prot/Creat Ratio, Ur 0 09 0 00 - 0 10   PSA Total, Diagnostic   Result Value Ref Range    PSA, Diagnostic 0 3 0 0 - 4 0 ng/mL             Invalid input(s): ALBUMIN      Radiology review:   chest X-ray    Ultrasound      Portions of the record may have been created with voice recognition software  Occasional wrong word or "sound a like" substitutions may have occurred due to the inherent limitations of voice recognition software  Read the chart carefully and recognize, using context, where substitutions have occurred

## 2019-09-05 NOTE — PATIENT INSTRUCTIONS
1  No medication changes today  2  Please go for nonfasting lab work in the morning in the next 1-2 weeks at your convenience    3  Please take 1 week a blood pressure readings now morning and evening just sitting:  -the morning readings before taking any medications  -the evening readings before supper  Then please bring in these readings along with your blood pressure machine to correlate with the office machine; we will set up an appointment with 1 of my advanced practitioner's at that time  If your blood pressure is low we may consider stopping lisinopril at that time  4  Please go for lab work in 3 months nonfasting    5  Follow-up in 6 months:  -please bring in 1 week a blood pressure readings morning and evening, sitting and standing   · Take the morning readings before any medications  · Take the evening readings before supper  · When taking standing readings, keep your arm supported at heart level and not dangling  · Make sure you are sitting with your back supported in feet on the ground on crossed  · Make sure you have not taken any coffee or caffeine products or exercised or smoke cigarettes at least 30 minutes before taking your blood pressure  -please go for morning lab work but nonfasting    6  General instructions:  -AVOID SALT BUT NOT ADDING AN READING LABELS TO MAKE SURE THERE IS LOW-SALT IN THE FOOD THAT YOU ARE EATING  -avoid nonsteroidal anti-inflammatory drugs such as Naprosyn, ibuprofen, Aleve, Advil, Celebrex, etc   You can use Tylenol as needed if you do not have any liver condition to be concerned about  -avoid medications such as Sudafed or decongestants and antihistamines that contained the D component which is the decongestant    You can take antihistamines without the decongestant or D component   -try to exercise at least 30 minutes 3 days a week to begin with with an ultimate goal of 5 days a week for at least 30 minutes  -try to lose 5-10 lb by your next visit  In regards to the low sodium, continue modest fluid restriction of 48-64 oz; use Gatorade or any other electrolyte solution if dehydrated

## 2019-09-09 ENCOUNTER — TELEPHONE (OUTPATIENT)
Dept: FAMILY MEDICINE CLINIC | Facility: CLINIC | Age: 49
End: 2019-09-09

## 2019-09-09 NOTE — LETTER
September 25, 2019     Kayleigh [id number]  [place] [id number]      Dear Mr Sheehan Numbers: In addition to helping you feel better when you are sick, we are interested in preventing illness and injury in the first place  In the spirit of maintaining your good health, our system indicates that you are due for the following:    Health Maintenance Due   Topic Date Due    CRC Screening: Colonoscopy  1970    Pneumococcal Vaccine: Pediatrics (0 to 5 Years) and At-Risk Patients (6 to 59 Years) (1 of 1 - PPSV23) 08/03/1975    DM Eye Exam  08/03/1979    HEPATITIS B VACCINES (1 of 3 - Risk 3-dose series) 08/03/1988    DTaP,Tdap,and Td Vaccines (1 - Tdap) 08/03/1990    HEMOGLOBIN A1C  04/03/2019    INFLUENZA VACCINE  07/01/2019    Diabetic Foot Exam  09/12/2019       Please call us to make an appointment at your earliest convenience  I look forward to seeing you soon        Sincerely,         León Iglesias MA    CC: No Recipients

## 2019-09-09 NOTE — TELEPHONE ENCOUNTER
left message for patient to give us a call to set up appointment advised no more refils will be given until appointment is set up

## 2019-09-16 ENCOUNTER — TELEPHONE (OUTPATIENT)
Dept: FAMILY MEDICINE CLINIC | Facility: CLINIC | Age: 49
End: 2019-09-16

## 2019-10-16 ENCOUNTER — OFFICE VISIT (OUTPATIENT)
Dept: FAMILY MEDICINE CLINIC | Facility: CLINIC | Age: 49
End: 2019-10-16
Payer: COMMERCIAL

## 2019-10-16 VITALS
SYSTOLIC BLOOD PRESSURE: 118 MMHG | DIASTOLIC BLOOD PRESSURE: 82 MMHG | HEIGHT: 73 IN | WEIGHT: 222 LBS | RESPIRATION RATE: 16 BRPM | TEMPERATURE: 97.7 F | BODY MASS INDEX: 29.42 KG/M2 | OXYGEN SATURATION: 96 % | HEART RATE: 98 BPM

## 2019-10-16 DIAGNOSIS — I10 BENIGN ESSENTIAL HYPERTENSION: Primary | ICD-10-CM

## 2019-10-16 DIAGNOSIS — F41.9 ANXIETY: ICD-10-CM

## 2019-10-16 DIAGNOSIS — Z01.00 DIABETIC EYE EXAM (HCC): ICD-10-CM

## 2019-10-16 DIAGNOSIS — E78.1 PURE HYPERGLYCERIDEMIA: ICD-10-CM

## 2019-10-16 DIAGNOSIS — E11.8 TYPE 2 DIABETES MELLITUS WITH COMPLICATION (HCC): ICD-10-CM

## 2019-10-16 DIAGNOSIS — I10 BENIGN ESSENTIAL HYPERTENSION: ICD-10-CM

## 2019-10-16 DIAGNOSIS — E11.9 DIABETIC EYE EXAM (HCC): ICD-10-CM

## 2019-10-16 DIAGNOSIS — E78.5 DYSLIPIDEMIA: ICD-10-CM

## 2019-10-16 DIAGNOSIS — E11.9 TYPE 2 DIABETES MELLITUS WITHOUT COMPLICATION, WITHOUT LONG-TERM CURRENT USE OF INSULIN (HCC): ICD-10-CM

## 2019-10-16 DIAGNOSIS — Z00.01 ENCOUNTER FOR WELL ADULT EXAM WITH ABNORMAL FINDINGS: Primary | ICD-10-CM

## 2019-10-16 DIAGNOSIS — L21.9 SEBORRHEIC DERMATITIS: ICD-10-CM

## 2019-10-16 DIAGNOSIS — Z12.11 COLON CANCER SCREENING: ICD-10-CM

## 2019-10-16 LAB — SL AMB POCT HEMOGLOBIN AIC: 7.3 (ref ?–6.5)

## 2019-10-16 PROCEDURE — 3074F SYST BP LT 130 MM HG: CPT | Performed by: FAMILY MEDICINE

## 2019-10-16 PROCEDURE — 99396 PREV VISIT EST AGE 40-64: CPT | Performed by: FAMILY MEDICINE

## 2019-10-16 PROCEDURE — 99214 OFFICE O/P EST MOD 30 MIN: CPT | Performed by: FAMILY MEDICINE

## 2019-10-16 PROCEDURE — 3079F DIAST BP 80-89 MM HG: CPT | Performed by: FAMILY MEDICINE

## 2019-10-16 PROCEDURE — 3008F BODY MASS INDEX DOCD: CPT | Performed by: FAMILY MEDICINE

## 2019-10-16 RX ORDER — CLOBETASOL PROPIONATE 0.05 G/100ML
SHAMPOO TOPICAL 2 TIMES DAILY
Qty: 118 ML | Refills: 2 | Status: SHIPPED | OUTPATIENT
Start: 2019-10-16

## 2019-10-16 RX ORDER — LISINOPRIL 5 MG/1
5 TABLET ORAL EVERY 24 HOURS
Qty: 90 TABLET | Refills: 1 | Status: SHIPPED | OUTPATIENT
Start: 2019-10-16

## 2019-10-16 RX ORDER — CLOBETASOL PROPIONATE 0.5 MG/G
CREAM TOPICAL 2 TIMES DAILY
Qty: 30 G | Refills: 2 | Status: SHIPPED | OUTPATIENT
Start: 2019-10-16

## 2019-10-16 RX ORDER — ATORVASTATIN CALCIUM 20 MG/1
20 TABLET, FILM COATED ORAL DAILY
Qty: 90 TABLET | Refills: 1 | Status: SHIPPED | OUTPATIENT
Start: 2019-10-16

## 2019-10-16 NOTE — PATIENT INSTRUCTIONS

## 2019-10-16 NOTE — ASSESSMENT & PLAN NOTE
Advice and education were given regarding nutrition, aerobic exercises, weight bearing exercises, cardiovascular risk reduction, fall risk reduction, and age appropriate supplements  The patient was counseled regarding instructions for management, risk factor reductions, prognosis, risks and benefits of treatment options, patient and family education, and importance of compliance with treatment       Patient declined flu shot patient decline pneumonia shot  [place] declined colonoscopy screen for Colon cancer discuss FIT test and he agree

## 2019-10-16 NOTE — ASSESSMENT & PLAN NOTE
A chronic uncontrolled encouraged patient to watch for the portion control low carb diet healthy food office neck increase physical activity totally minute a day 5 days a week or 60 minutes every other day

## 2019-10-16 NOTE — ASSESSMENT & PLAN NOTE
Chronic asymptomatic fair control continue current management low-salt diet increase physical activity discussed with the patient

## 2019-10-16 NOTE — ASSESSMENT & PLAN NOTE
A chronic asymptomatic fair control continue atorvastatin 20 mg encouraged patient to lose weight and low fat diet discussed with the patient

## 2019-10-16 NOTE — ASSESSMENT & PLAN NOTE
Lab Results   Component Value Date    HGBA1C 7 3 (A) 10/16/2019     A chronic improve hemoglobin A1c from 9 0 to 7 3 continue current management encouraged patient to lose weight and the a to follow up with a strict low carb diet we offer him and refer to diabetic educator he decline  Sign of symptom of hypoglycemia discussed with the patient  And patient already on statin

## 2019-10-16 NOTE — PROGRESS NOTES
FAMILY PRACTICE HEALTH MAINTENANCE OFFICE VISIT  [facility] [place] [facility]    NAME: Michi Stanley  AGE: 47 y o  SEX: male  : 1970     DATE: 10/16/2019    Assessment and Plan     1  Encounter for well adult exam with abnormal findings  Assessment & Plan:  Advice and education were given regarding nutrition, aerobic exercises, weight bearing exercises, cardiovascular risk reduction, fall risk reduction, and age appropriate supplements  The patient was counseled regarding instructions for management, risk factor reductions, prognosis, risks and benefits of treatment options, patient and family education, and importance of compliance with treatment  Patient declined flu shot patient decline pneumonia shot  patietn declined colonoscopy screen for Colon cancer discuss FIT test and he agree      2  BMI 29 0-29 9,adult  Assessment & Plan:  A chronic uncontrolled encouraged patient to watch for the portion control low carb diet healthy food office neck increase physical activity totally minute a day 5 days a week or 60 minutes every other day      3  Diabetic eye exam [facility])  -     Ambulatory Referral to Ophthalmology; Future    4  Colon cancer screening    5  Type 2 diabetes mellitus without complication, without long-term current use of insulin ([facility])  Assessment & Plan:    Lab Results   Component Value Date    HGBA1C 7 3 (A) 10/16/2019     A chronic improve hemoglobin A1c from 9 0 to 7 3 continue current management encouraged patient to lose weight and the a to follow up with a strict low carb diet we offer him and refer to diabetic educator he decline  Sign of symptom of hypoglycemia discussed with the patient  And patient already on statin    Orders:  -     Basic metabolic panel; Future  -     Lipid panel; Future  -     Hemoglobin A1C; Future  -     POCT hemoglobin A1c    6   Benign essential hypertension  Assessment & Plan:  Chronic asymptomatic fair control continue current management low-salt diet increase physical activity discussed with the patient    Orders:  -     Basic metabolic panel; Future  -     Lipid panel; Future  -     Hemoglobin A1C; Future    7  Dyslipidemia  Assessment & Plan:  A chronic asymptomatic fair control continue atorvastatin 20 mg encouraged patient to lose weight and low fat diet discussed with the patient    Orders:  -     Basic metabolic panel; Future  -     Lipid panel; Future  -     Hemoglobin A1C; Future    8  Anxiety  Assessment & Plan:  Chronic asymptomatic fair controlled patient on Ativan 1 mg on p r n  Base proper use of medication possible side effect discussed with the patient            · Patient Counseling:   · Nutrition: Stressed importance of a well balanced diet, moderation of sodium/saturated fat, caloric balance and sufficient intake of fiber  · Exercise: Stressed the importance of regular exercise with a goal of 150 minutes per week  · Dental Health: Discussed daily flossing and brushing and regular dental visits     · Immunizations reviewed: Declined recommended vaccinations  · Discussed benefits of:  Colon Cancer Screening and Prostate Cancer Screening    BMI Counseling: Body mass index is 29 29 kg/m²  Discussed with patient's BMI with him            Chief Complaint     Chief Complaint   Patient presents with    Physical Exam     Yearly Physical Exam        History of Present Illness     Patient here for annual physical exam deny any chest pain short of breath no palpitation no headache no blurred vision no weakness or lateralized of the symptom no abdomen pain nausea vomiting or diarrhea no renal problem no rash no fever no change in the weight and no change in the mood the he deny smoking no sleeping problem patient and a due for colonoscopy screening for colon cancer      Well Adult Physical   Patient here for a comprehensive physical exam       Diet and Physical Activity  Diet: low carbohydrate diet  Exercise: infrequently      Depression Screen  PHQ-9 Depression Screening    PHQ-9:    Frequency of the following problems over the past two weeks:       Little interest or pleasure in doing things:  0 - not at all  Feeling down, depressed, or hopeless:  0 - not at all  PHQ-2 Score:  0          General Health  Hearing: Normal:  bilateral  Vision: wears glasses  Dental: no dental visits for >1 year      The following portions of the patient's history were reviewed and updated as appropriate: allergies, current medications, past family history, past medical history, past social history, past surgical history and problem list     Review of Systems     Review of Systems   Constitutional: Negative for fatigue and fever  HENT: Negative for ear pain, sinus pressure, sinus pain and sore throat  Eyes: Negative for pain and redness  Respiratory: Negative for cough, chest tightness and shortness of breath  Cardiovascular: Negative for chest pain, palpitations and leg swelling  Gastrointestinal: Negative for abdominal pain, blood in stool, constipation, diarrhea and nausea  Endocrine: Negative for heat intolerance and polydipsia  Genitourinary: Negative for flank pain, frequency and hematuria  Musculoskeletal: Negative for back pain and joint swelling  Skin: Negative for rash  Neurological: Negative for dizziness, numbness and headaches  Hematological: Does not bruise/bleed easily  Psychiatric/Behavioral: Negative for agitation and behavioral problems         Past Medical History     Past Medical History:   Diagnosis Date    Anxiety 9/12/2018    Diabetes mellitus ([facility] [address] )     GERD (gastroesophageal reflux disease)     Hypertension        Past Surgical History     Past Surgical History:   Procedure Laterality Date    LASIK Bilateral 2004    PILONIDAL CYST EXCISION  2011       Social History     Social History     Socioeconomic History    Marital status: /Civil Union     Spouse name: None  Number of children: None    Years of education: None    Highest education level: None   Occupational History    None   Social Needs    Financial resource strain: Not hard at all   [employer] insecurity:     Worry: Never true     Inability: Never true   [employer] needs:     Medical: No     Non-medical: No   Tobacco Use    Smoking status: Never Smoker    Smokeless tobacco: Never Used   Substance and Sexual Activity    Alcohol use: No     Frequency: Never     Binge frequency: Never    Drug use: No    Sexual activity: Yes     Partners: Female   Lifestyle    Physical activity:     Days per week: 2 days     Minutes per session: 60 min    Stress: Not at all   Relationships    Social connections:     Talks on phone: More than three times a week     Gets together: Once a week     Attends [employer] service: More than 4 times per year     Active member of club or organization: Yes     Attends meetings of clubs or organizations: More than 4 times per year     Relationship status:     Intimate partner violence:     Fear of current or ex partner: No     Emotionally abused: No     Physically abused: No     Forced sexual activity: No   Other Topics Concern    None   Social History Narrative    None       Family History     Family History   Problem Relation Age of Onset    Heart disease Father     Diabetes type II Brother         MELLITUS    Heart disease Brother        Current Medications       Current Outpatient Medications:     ALPRAZolam (XANAX) 1 mg tablet, Take 1 mg by mouth daily at bedtime as needed for anxiety, Disp: , Rfl:     atorvastatin (LIPITOR) 20 mg tablet, Take 1 tablet (20 mg total) by mouth daily, Disp: 90 tablet, Rfl: 1    clobetasol (TEMOVATE) 0 05 % cream, Apply topically 2 (two) times a day, Disp: 30 g, Rfl: 2    clobetasol propionate (CLOBEX) 0 05 % shampoo, Apply topically 2 (two) times a day, Disp: 118 mL, Rfl: 2    Empagliflozin (JARDIANCE) 25 MG TABS, Take 1 tablet (25 mg total) by mouth every morning, Disp: 90 tablet, Rfl: 1    esomeprazole (NEXIUM) 20 mg capsule, Take 1 capsule by mouth Every 12 hours, Disp: , Rfl:     lisinopril (ZESTRIL) 5 mg tablet, Take 1 tablet (5 mg total) by mouth every 24 hours, Disp: 90 tablet, Rfl: 1    metFORMIN (GLUCOPHAGE) 1000 MG tablet, Take 1 tablet (1,000 mg total) by mouth 2 (two) times a day with meals, Disp: 180 tablet, Rfl: 1    venlafaxine (EFFEXOR) 75 mg tablet, Take 75 mg by mouth 2 (two) times a day, Disp: , Rfl:      Allergies     Allergies   Allergen Reactions    No Active Allergies        Objective     /82 (BP Location: Left arm, Patient Position: Sitting, Cuff Size: Large)   Pulse 98   Temp 97 7 °F (36 5 °C) (Tympanic)   Resp 16   Ht 6' 1" (1 854 m)   Wt 101 kg (222 lb)   SpO2 96%   BMI 29 29 kg/m²      Physical Exam   Constitutional: He is oriented to person, place, and time  He appears well-developed and well-nourished  HENT:   Head: Normocephalic  Right Ear: External ear normal    Left Ear: External ear normal    Eyes: Conjunctivae and EOM are normal  Right eye exhibits no discharge  Left eye exhibits no discharge  Neck: No JVD present  Cardiovascular: Normal rate, regular rhythm and normal heart sounds  Exam reveals no gallop  No murmur heard  Pulmonary/Chest: Effort normal  No respiratory distress  He has no wheezes  He has no rales  He exhibits no tenderness  Abdominal: He exhibits no mass  There is no tenderness  There is no rebound  Musculoskeletal: He exhibits no edema or tenderness  Neurological: He is alert and oriented to person, place, and time  Skin: No rash noted  No erythema  Psychiatric: He has a normal mood and affect  His behavior is normal              Sonja Boyer MD  [facility]  BMI Counseling: Body mass index is 29 29 kg/m²   The BMI is above normal  Nutrition recommendations include reducing portion sizes, decreasing overall calorie intake, 3-5 servings of fruits/vegetables daily and reducing fast food intake  Exercise recommendations include moderate aerobic physical activity for 150 minutes/week

## 2019-10-16 NOTE — ASSESSMENT & PLAN NOTE
Chronic asymptomatic fair controlled patient on Ativan 1 mg on p r n   Base proper use of medication possible side effect discussed with the patient

## 2019-10-16 NOTE — PROGRESS NOTES
Subjective:   Chief Complaint   Patient presents with    Physical Exam     Yearly Physical Exam         Patient ID: León Hernandez is a 47 y o  male  Patient here for annual physical exam follow-up with a chronic condition patient history of non-insulin-dependent diabetic and a tolerated his medication without any side effect the her blood sugar improved the his hemoglobin A1c down to 7 3 deny any sinus symptom of hypoglycemia asymptomatic deny increased thirsty increased frequency urination no dizziness and no headache patient history of the hypertension blood pressure fair control on current management tolerated well deny any chest pain short of breath no palpitation and no dyspnea on exertion no lower extremity edema patient's history of hyperlipidemia on statin tolerated well deny any chest pain short of breath no palpitation no TIA symptom patient was history of an anxiety on exam neck is once a day tolerated well deny any dizziness light headache mood is stable improve in his sleeping  The recent blood work discussed with the patient      The following portions of the patient's history were reviewed and updated as appropriate: allergies, current medications, past family history, past medical history, past social history, past surgical history and problem list     Review of Systems   Constitutional: Negative for fatigue and fever  HENT: Negative for ear pain, sinus pressure, sinus pain and sore throat  Eyes: Negative for pain and redness  Respiratory: Negative for cough, chest tightness and shortness of breath  Cardiovascular: Negative for chest pain, palpitations and leg swelling  Gastrointestinal: Negative for abdominal pain, blood in stool, constipation, diarrhea and nausea  Genitourinary: Negative for flank pain, frequency and hematuria  Musculoskeletal: Negative for back pain and joint swelling  Skin: Negative for rash  Neurological: Negative for dizziness, numbness and headaches  Hematological: Does not bruise/bleed easily  Objective:  Vitals:    10/16/19 0759   BP: 118/82   BP Location: Left arm   Patient Position: Sitting   Cuff Size: Large   Pulse: 98   Resp: 16   Temp: 97 7 °F (36 5 °C)   TempSrc: Tympanic   SpO2: 96%   Weight: 101 kg (222 lb)   Height: 6' 1" (1 854 m)      Physical Exam   Constitutional: He is oriented to person, place, and time  He appears well-developed and well-nourished  HENT:   Head: Normocephalic  Right Ear: External ear normal    Left Ear: External ear normal    Eyes: Conjunctivae and EOM are normal  Right eye exhibits no discharge  Left eye exhibits no discharge  Neck: No JVD present  Cardiovascular: Normal rate, regular rhythm and normal heart sounds  Exam reveals no gallop  Pulses are no weak pulses  No murmur heard  Pulses:       Dorsalis pedis pulses are 2+ on the right side, and 2+ on the left side  Pulmonary/Chest: Effort normal  No respiratory distress  He has no wheezes  He has no rales  He exhibits no tenderness  Abdominal: He exhibits no mass  There is no tenderness  There is no rebound  Musculoskeletal: He exhibits no edema or tenderness  Feet:   Right Foot:   Skin Integrity: Negative for warmth  Left Foot:   Skin Integrity: Negative for warmth  Neurological: He is alert and oriented to person, place, and time  Skin: No rash noted  No erythema  Patient's shoes and socks removed  Right Foot/Ankle   Right Foot Inspection  Skin Exam: skin intact no warmth and no pre-ulcer                          Toe Exam: no swelling and erythema  Sensory       Monofilament testing: intact  Vascular  Capillary refills: < 3 seconds  The right DP pulse is 2+  Left Foot/Ankle  Left Foot Inspection  Skin Exam: skin intactno warmth and no pre-ulcer                         Toe Exam: no swelling and no erythema                   Sensory       Monofilament: intact  Vascular  Capillary refills: < 3 seconds  The left DP pulse is 2+  Assign Risk Category:  No deformity present; No loss of protective sensation; No weak pulses       Risk: 0    Assessment/Plan:    Encounter for well adult exam with abnormal findings  Advice and education were given regarding nutrition, aerobic exercises, weight bearing exercises, cardiovascular risk reduction, fall risk reduction, and age appropriate supplements  The patient was counseled regarding instructions for management, risk factor reductions, prognosis, risks and benefits of treatment options, patient and family education, and importance of compliance with treatment  Patient declined flu shot patient decline pneumonia shot  patietn declined colonoscopy screen for Colon cancer discuss FIT test and he agree    BMI 29 0-29 9,adult  A chronic uncontrolled encouraged patient to watch for the portion control low carb diet healthy food office neck increase physical activity totally minute a day 5 days a week or 60 minutes every other day    Type 2 diabetes mellitus without complication (HCC)    Lab Results   Component Value Date    HGBA1C 7 3 (A) 10/16/2019     A chronic improve hemoglobin A1c from 9 0 to 7 3 continue current management encouraged patient to lose weight and the a to follow up with a strict low carb diet we offer him and refer to diabetic educator he decline  Sign of symptom of hypoglycemia discussed with the patient  And patient already on statin    Benign essential hypertension  Chronic asymptomatic fair control continue current management low-salt diet increase physical activity discussed with the patient    Dyslipidemia  A chronic asymptomatic fair control continue atorvastatin 20 mg encouraged patient to lose weight and low fat diet discussed with the patient    Anxiety  Chronic asymptomatic fair controlled patient on Ativan 1 mg on p r n   Base proper use of medication possible side effect discussed with the patient       Diagnoses and all orders for this visit:    Encounter for well adult exam with abnormal findings    BMI 29 0-29 9,adult    Diabetic eye exam [facility])  -     Ambulatory Referral to Ophthalmology; Future    Colon cancer screening  -     Cancel: Ambulatory referral to Gastroenterology; Future    Type 2 diabetes mellitus without complication, without long-term current use of insulin (HCC)  -     Basic metabolic panel; Future  -     Lipid panel; Future  -     Hemoglobin A1C; Future  -     POCT hemoglobin A1c    Benign essential hypertension  -     Basic metabolic panel; Future  -     Lipid panel; Future  -     Hemoglobin A1C; Future    Dyslipidemia  -     Basic metabolic panel; Future  -     Lipid panel;  Future  -     Hemoglobin A1C; Future    Anxiety

## 2020-01-20 ENCOUNTER — TELEPHONE (OUTPATIENT)
Dept: NEPHROLOGY | Facility: CLINIC | Age: 50
End: 2020-01-20

## 2020-01-20 NOTE — TELEPHONE ENCOUNTER
I called and left message for patient to call back to schedule March follow up appt with Dr Familia Green

## 2020-04-16 ENCOUNTER — TELEPHONE (OUTPATIENT)
Dept: NEPHROLOGY | Facility: CLINIC | Age: 50
End: 2020-04-16

## 2020-05-22 ENCOUNTER — TELEPHONE (OUTPATIENT)
Dept: FAMILY MEDICINE CLINIC | Facility: CLINIC | Age: 50
End: 2020-05-22

## 2021-06-16 ENCOUNTER — TELEPHONE (OUTPATIENT)
Dept: FAMILY MEDICINE CLINIC | Facility: CLINIC | Age: 51
End: 2021-06-16